# Patient Record
Sex: MALE | Race: BLACK OR AFRICAN AMERICAN | NOT HISPANIC OR LATINO | Employment: UNEMPLOYED | URBAN - METROPOLITAN AREA
[De-identification: names, ages, dates, MRNs, and addresses within clinical notes are randomized per-mention and may not be internally consistent; named-entity substitution may affect disease eponyms.]

---

## 2017-03-06 ENCOUNTER — OFFICE VISIT (OUTPATIENT)
Dept: FAMILY MEDICINE | Facility: CLINIC | Age: 52
End: 2017-03-06

## 2017-03-06 VITALS
SYSTOLIC BLOOD PRESSURE: 111 MMHG | TEMPERATURE: 98.7 F | WEIGHT: 175.2 LBS | HEART RATE: 103 BPM | DIASTOLIC BLOOD PRESSURE: 75 MMHG | BODY MASS INDEX: 26.64 KG/M2

## 2017-03-06 DIAGNOSIS — S83.281A TEAR OF LATERAL CARTILAGE OR MENISCUS OF KNEE, CURRENT, RIGHT, INITIAL ENCOUNTER: Primary | ICD-10-CM

## 2017-03-06 RX ORDER — NAPROXEN 500 MG/1
500 TABLET ORAL 2 TIMES DAILY WITH MEALS
Qty: 60 TABLET | Refills: 1 | Status: SHIPPED | OUTPATIENT
Start: 2017-03-06 | End: 2017-04-26

## 2017-03-06 NOTE — PROGRESS NOTES
"Subjective/HPI:    Missael Garcia is a 51-year-old male presenting with \"pain in knees.\"    Had an injury which he has trouble describing in high school involving his right knee. Since then, knee clicks and locks. Had one episode (~3-4 years ago - he is a poor historian for time), in which his right kneecap became displaced and he had to push it back into place. Has more trouble going up stairs than down them. Will sometimes have to \"pull down\" on his lower leg when his knee locks so the joint becomes mobile again.    Left knee also clicks and locks but isn't as painful or as bothersome.    Has not taken any medication for the pain. Has never done physical therapy.    No other questions or concerns at this time.    Objective:    /75 (BP Location: Left arm, Patient Position: Chair, Cuff Size: Adult Regular)  Pulse 103  Temp 98.7  F (37.1  C) (Oral)  Wt 175 lb 3.2 oz (79.5 kg)  BMI 26.64 kg/m2    Constitutional: Alert, attention a bit distracted, sometimes responds slowly or late to questions, poor eye contact unless directly communicating with him. No acute distress.  CV: RRR, normal S1,S2, no m/r/g's  Resp: No accessory muscle use, CTAB.  MSK: No evidence of effusion, swelling, hematoma, erythema. Non-tender to palpation. Clicking, locking, palpable crepitus in both knees with flexion/extension of joint. No pain with varus/valgus stress bilaterally.    Assessment and Plan:    1. Bilateral Meniscus Tear: worse in right knee. Talk about risks and benefits of surgery vs. Conservative management. Patient would like to trial PT before considering surgical interventions. Will also give naproxen for symptom relief.  - PT referral  - naproxen BID for pain  - if no relief in 6 months, consider reparative surgery/referral to ortho  "

## 2017-03-06 NOTE — MR AVS SNAPSHOT
After Visit Summary   3/6/2017    Missael Garcia    MRN: [id number]           Patient Information     Date Of Birth          1965        Visit Information        Provider Department      3/6/2017 1:50 PM Tariq Guzman MD [facility]        Today's Diagnoses     Tear of lateral cartilage or meniscus of knee, current, right, initial encounter    -  1      Care Instructions    3/8/2017 8:13 AM: [facility] ([phone]). They will contact patient to richard Garcia         Follow-ups after your visit        Additional Services     PHYSICAL THERAPY REFERRAL       PT/OT REFERRAL  Missael Garcia  1965  Phone #: [phone] (home)    needed: No  Language: English    PT/OT  Facility: Eval and treat for right knee pain.  Clinically this is an int derangement.                  Who to contact     Please call your clinic at [phone] to:    Ask questions about your health    Make or cancel appointments    Discuss your medicines    Learn about your test results    Speak to your doctor   If you have compliments or concerns about an experience at your clinic, or if you wish to file a complaint, please contact [facility] Patient Relations at [phone] or email us at [email or web address]         Additional Information About Your Visit        MyChart Information     [employer] gives you secure access to your electronic health record. If you see a primary care provider, you can also send messages to your care team and make appointments. If you have questions, please call your primary care clinic.  If you do not have a primary care provider, please call [phone] and they will assist you.      [employer] is an electronic gateway that provides easy, online access to your medical records. With [employer], you can request a clinic appointment, read your test results, renew a prescription or communicate  with your care team.     To access your existing account, please contact your [facility] or call [phone] for assistance.        Care EveryWhere ID     This is your Care EveryWhere ID. This could be used by other organizations to access your [place] medical records  [id number]        Your Vitals Were     Pulse Temperature BMI (Body Mass Index)             103 98.7  F (37.1  C) (Oral) 26.64 kg/m2          Blood Pressure from Last 3 Encounters:   03/06/17 111/75   10/11/16 116/80   08/30/16 123/85    Weight from Last 3 Encounters:   03/06/17 175 lb 3.2 oz (79.5 kg)   10/11/16 172 lb 6.4 oz (78.2 kg)   08/30/16 167 lb 6 oz (75.9 kg)              We Performed the Following     XR KNEE RT 3 VW          Today's Medication Changes          These changes are accurate as of: 3/6/17 11:59 PM.  If you have any questions, ask your nurse or doctor.               Start taking these medicines.        Dose/Directions    naproxen 500 MG tablet   Commonly known as:  NAPROSYN   Used for:  Tear of lateral cartilage or meniscus of knee, current, right, initial encounter   Started by:  Tariq Guzman MD        Dose:  500 mg   Take 1 tablet (500 mg) by mouth 2 times daily (with meals)   Quantity:  60 tablet   Refills:  1            Where to get your medicines      These medications were sent to [address] [address]     Phone:  [phone]     naproxen 500 MG tablet                Primary Care Provider Office Phone # Fax #    Tariq Guzman [phone]       [address] [address]        Thank you!     Thank you for choosing [facility]  for your care. Our goal is always to provide you with excellent care. Hearing back from our patients is one way we can continue to improve our services. Please take a few minutes to complete the written survey that you may  receive in the mail after your visit with us. Thank you!             Your Updated Medication List - Protect others around you: Learn how to safely use, store and throw away your medicines at www.disposemymeds.org.          This list is accurate as of: 3/6/17 11:59 PM.  Always use your most recent med list.                   Brand Name Dispense Instructions for use    benztropine 1 MG tablet    COGENTIN     Take 1 mg by mouth At Bedtime       BUPROPION HCL PO          CLONIDINE HCL PO      Take 0.1 mg by mouth as needed Take every morning and 1-2 prn for impulsivity.       haloperidol 5 MG tablet    HALDOL     Take 5 mg by mouth At Bedtime       naproxen 500 MG tablet    NAPROSYN    60 tablet    Take 1 tablet (500 mg) by mouth 2 times daily (with meals)       omeprazole 20 MG CR capsule    priLOSEC    30 capsule    Take 1 capsule (20 mg) by mouth 2 times daily       * order for DME     1 Units    Equipment being ordered: Jobst compression stockings knee high, 30 torr       * order for DME     1 Units    Equipment being ordered: wheeled knee walker for use after foot surgery       PRAZOSIN HCL PO      Take 2 mg by mouth At Bedtime       SEROQUEL PO      Take 400 mg by mouth 2 qhs       traZODone 100 MG tablet    DESYREL     Take 2 tablets by mouth At Bedtime       venlafaxine 150 MG 24 hr capsule    EFFEXOR-XR     Take 2 capsules by mouth daily       * Notice:  This list has 2 medication(s) that are the same as other medications prescribed for you. Read the directions carefully, and ask your doctor or other care provider to review them with you.

## 2017-03-08 ENCOUNTER — AMBULATORY - HEALTHEAST (OUTPATIENT)
Dept: ADMINISTRATIVE | Facility: REHABILITATION | Age: 52
End: 2017-03-08

## 2017-03-08 DIAGNOSIS — S83.281D TEAR OF LATERAL CARTILAGE OR MENISCUS OF KNEE, CURRENT, RIGHT, SUBSEQUENT ENCOUNTER: ICD-10-CM

## 2017-03-08 NOTE — PATIENT INSTRUCTIONS
3/8/2017 8:13 AM: Physical Therapy referral faxed to [facility] ([phone]). They will contact patient to schedule.  Tashia Garcia

## 2017-04-06 DIAGNOSIS — K21.9 GASTROESOPHAGEAL REFLUX DISEASE, ESOPHAGITIS PRESENCE NOT SPECIFIED: ICD-10-CM

## 2017-04-26 DIAGNOSIS — S83.281A TEAR OF LATERAL CARTILAGE OR MENISCUS OF KNEE, CURRENT, RIGHT, INITIAL ENCOUNTER: ICD-10-CM

## 2017-04-26 RX ORDER — NAPROXEN 500 MG/1
500 TABLET ORAL 2 TIMES DAILY WITH MEALS
Qty: 60 TABLET | Refills: 1 | Status: SHIPPED | OUTPATIENT
Start: 2017-04-26 | End: 2017-06-22

## 2017-04-28 ENCOUNTER — AMBULATORY - HEALTHEAST (OUTPATIENT)
Dept: ADMINISTRATIVE | Facility: REHABILITATION | Age: 52
End: 2017-04-28

## 2017-04-28 ENCOUNTER — OFFICE VISIT (OUTPATIENT)
Dept: FAMILY MEDICINE | Facility: CLINIC | Age: 52
End: 2017-04-28

## 2017-04-28 VITALS
BODY MASS INDEX: 27.06 KG/M2 | HEIGHT: 67 IN | HEART RATE: 97 BPM | DIASTOLIC BLOOD PRESSURE: 86 MMHG | WEIGHT: 172.4 LBS | SYSTOLIC BLOOD PRESSURE: 118 MMHG

## 2017-04-28 DIAGNOSIS — M17.0 OSTEOARTHRITIS OF BOTH KNEES, UNSPECIFIED OSTEOARTHRITIS TYPE: ICD-10-CM

## 2017-04-28 DIAGNOSIS — M17.0 PRIMARY OSTEOARTHRITIS OF BOTH KNEES: ICD-10-CM

## 2017-04-28 DIAGNOSIS — S83.289D TEAR OF LATERAL CARTILAGE OR MENISCUS OF KNEE, CURRENT, UNSPECIFIED LATERALITY, SUBSEQUENT ENCOUNTER: Primary | ICD-10-CM

## 2017-04-28 NOTE — PROGRESS NOTES
SUBJECTIVE:  Missael Garcia comes in today because of ongoing knee pain, which is something that he had for several months.  I ordered an x-ray of the knees and [too much background noise].  He stated the knees hurt mostly when he was ambulating.  It hurt in particular when he was going downstairs, which was more painful than going up stairs.  It hurt  equally in both knees.  At this point, he had only tried some naproxen for the knee pain, and it helped a little bit, but not greatly.   OBJECTIVE:  On examination, patient had full ROM of the knees, with a little bit of pain at the extremes of extension and flexion.  No crepitus.  No warmth or swelling.  No joint line tenderness.  Pulses were intact at the ankle bilaterally.  Good capillary refill.   X-rays were obtained which showed some slight narrowing of the joint space bilaterally, but no osteophytes or erosions.   ASSESSMENT:  Probable early OA.   PLAN:  We'll send him to Physical Therapy and see him back in a month's time.  If he isn't improving, we could proceed with corticosteroid injections if patient so desires.

## 2017-04-28 NOTE — PATIENT INSTRUCTIONS
PHYSICAL THERAPY REFERRAL:  Orders and demographics faxed to:  [facility] and they will contact  Adilene Pfeiffer () [phone] to schedule for patient.  PH:  [phone]  FAX:  [phone]  Elizabeth Ahmadi  4/28/17

## 2017-04-28 NOTE — MR AVS SNAPSHOT
After Visit Summary   4/28/2017    Missael Garcia    MRN: [id number]           Patient Information     Date Of Birth          1965        Visit Information        Provider Department      4/28/2017 9:20 AM Tariq Guzman MD [facility]        Today's Diagnoses     Tear of lateral cartilage or meniscus of knee, current, unspecified laterality, subsequent encounter    -  1    Osteoarthritis of both knees, unspecified osteoarthritis type          Care Instructions    PHYSICAL THERAPY REFERRAL:  Orders and demographics faxed to:  [facility] and they will contact  Adilene Pfeiffer () [phone] to schedule for patient.  PH:  [phone]  FAX:  [phone]  Elizabeth Ahmadi  4/28/17        Follow-ups after your visit        Additional Services     PHYSICAL THERAPY REFERRAL       PT/OT REFERRAL  Missael Garcia  1965  Phone #: [phone] (home)    needed: No  Language: English    PT/OT  Facility: [facility]      History: Bilat knee pain with known osteoarthritis.  Improve flexibility and motion.    Precautions/Contraindications: None    Imaging Studies: X-Ray    Surgical Procedure/Test Results: None    Treatment Goals:   Increase: Flexibility and Strength    Prognosis: good    Visits: Up to 12    Evaluate: Evaluate and treat    Plan: Manual Therapy and Flexibility Exercise    Ionto/Phonophoresis ok                  Who to contact     Please call your clinic at [phone] to:    Ask questions about your health    Make or cancel appointments    Discuss your medicines    Learn about your test results    Speak to your doctor   If you have compliments or concerns about an experience at your clinic, or if you wish to file a complaint, please contact [facility] Patient Relations at [phone] or email us at [email or web address]         Additional Information About Your Visit        MyChart Information     [employer] gives you secure  "access to your electronic health record. If you see a primary care provider, you can also send messages to your care team and make appointments. If you have questions, please call your primary care clinic.  If you do not have a primary care provider, please call [phone] and they will assist you.      [employer] is an electronic gateway that provides easy, online access to your medical records. With [employer], you can request a clinic appointment, read your test results, renew a prescription or communicate with your care team.     To access your existing account, please contact your [facility] or call [phone] for assistance.        Care EveryWhere ID     This is your Care EveryWhere ID. This could be used by other organizations to access your [place] medical records  [id number]        Your Vitals Were     Pulse Height BMI (Body Mass Index)             97 5' 6.75\" (169.5 cm) 27.2 kg/m2          Blood Pressure from Last 3 Encounters:   04/28/17 118/86   03/06/17 111/75   10/11/16 116/80    Weight from Last 3 Encounters:   04/28/17 172 lb 6.4 oz (78.2 kg)   03/06/17 175 lb 3.2 oz (79.5 kg)   10/11/16 172 lb 6.4 oz (78.2 kg)              We Performed the Following     XR KNEE RT 3 VW        Primary Care Provider Office Phone # Fax #    Tariq Guzman [phone]       [place] [address]        Thank you!     Thank you for choosing [facility]  for your care. Our goal is always to provide you with excellent care. Hearing back from our patients is one way we can continue to improve our services. Please take a few minutes to complete the written survey that you may receive in the mail after your visit with us. Thank you!             Your Updated Medication List - Protect others around you: Learn how to safely use, store and throw away your medicines at www.disposemymeds.org.          This list is accurate as of: 4/28/17 11:59 PM.  " Always use your most recent med list.                   Brand Name Dispense Instructions for use    benztropine 1 MG tablet    COGENTIN     Take 1 mg by mouth At Bedtime       BUPROPION HCL PO          CLONIDINE HCL PO      Take 0.1 mg by mouth as needed Take every morning and 1-2 prn for impulsivity.       haloperidol 5 MG tablet    HALDOL     Take 5 mg by mouth At Bedtime       naproxen 500 MG tablet    NAPROSYN    60 tablet    Take 1 tablet (500 mg) by mouth 2 times daily (with meals)       omeprazole 20 MG CR capsule    priLOSEC    30 capsule    Take 1 capsule (20 mg) by mouth 2 times daily       * order for DME     1 Units    Equipment being ordered: Jobst compression stockings knee high, 30 torr       * order for DME     1 Units    Equipment being ordered: wheeled knee walker for use after foot surgery       PRAZOSIN HCL PO      Take 2 mg by mouth At Bedtime       SEROQUEL PO      Take 400 mg by mouth 2 qhs       traZODone 100 MG tablet    DESYREL     Take 2 tablets by mouth At Bedtime       venlafaxine 150 MG 24 hr capsule    EFFEXOR-XR     Take 2 capsules by mouth daily       * Notice:  This list has 2 medication(s) that are the same as other medications prescribed for you. Read the directions carefully, and ask your doctor or other care provider to review them with you.

## 2017-05-02 ENCOUNTER — OFFICE VISIT - HEALTHEAST (OUTPATIENT)
Dept: PHYSICAL THERAPY | Facility: REHABILITATION | Age: 52
End: 2017-05-02

## 2017-05-02 DIAGNOSIS — M25.561 BILATERAL CHRONIC KNEE PAIN: ICD-10-CM

## 2017-05-02 DIAGNOSIS — M17.0 PRIMARY OSTEOARTHRITIS OF BOTH KNEES: ICD-10-CM

## 2017-05-02 DIAGNOSIS — M62.81 GENERALIZED MUSCLE WEAKNESS: ICD-10-CM

## 2017-05-02 DIAGNOSIS — M25.562 BILATERAL CHRONIC KNEE PAIN: ICD-10-CM

## 2017-05-02 DIAGNOSIS — G89.29 BILATERAL CHRONIC KNEE PAIN: ICD-10-CM

## 2017-05-04 ENCOUNTER — COMMUNICATION - HEALTHEAST (OUTPATIENT)
Dept: PHYSICAL THERAPY | Facility: REHABILITATION | Age: 52
End: 2017-05-04

## 2017-05-23 ENCOUNTER — OFFICE VISIT (OUTPATIENT)
Dept: FAMILY MEDICINE | Facility: CLINIC | Age: 52
End: 2017-05-23

## 2017-05-23 VITALS
WEIGHT: 172.8 LBS | BODY MASS INDEX: 27.27 KG/M2 | DIASTOLIC BLOOD PRESSURE: 77 MMHG | SYSTOLIC BLOOD PRESSURE: 115 MMHG | TEMPERATURE: 98.5 F | HEART RATE: 58 BPM

## 2017-05-23 DIAGNOSIS — H93.292 AUDITORY COMPLAINTS OF LEFT EAR: ICD-10-CM

## 2017-05-23 DIAGNOSIS — N52.9 ERECTILE DYSFUNCTION, UNSPECIFIED ERECTILE DYSFUNCTION TYPE: Primary | ICD-10-CM

## 2017-05-23 NOTE — MR AVS SNAPSHOT
After Visit Summary   5/23/2017    Missael Garcia    MRN: [id number]           Patient Information     Date Of Birth          1965        Visit Information        Provider Department      5/23/2017 10:40 AM Tariq Guzman MD [facility]        Today's Diagnoses     Erectile dysfunction, unspecified erectile dysfunction type    -  1    Auditory complaints of left ear           Follow-ups after your visit        Who to contact     Please call your clinic at [phone] to:    Ask questions about your health    Make or cancel appointments    Discuss your medicines    Learn about your test results    Speak to your doctor   If you have compliments or concerns about an experience at your clinic, or if you wish to file a complaint, please contact [facility] Patient Relations at [phone] or email us at [email or web address]         Additional Information About Your Visit        MyChart Information     [employer] gives you secure access to your electronic health record. If you see a primary care provider, you can also send messages to your care team and make appointments. If you have questions, please call your primary care clinic.  If you do not have a primary care provider, please call [phone] and they will assist you.      [employer] is an electronic gateway that provides easy, online access to your medical records. With [employer], you can request a clinic appointment, read your test results, renew a prescription or communicate with your care team.     To access your existing account, please contact your [facility] or call [phone] for assistance.        Care EveryWhere ID     This is your Care EveryWhere ID. This could be used by other organizations to access your [place] medical records  [id number]        Your Vitals Were     Pulse Temperature BMI (Body Mass Index)             58 98.5  F (36.9  C) (Oral) 27.27 kg/m2           Blood Pressure from Last 3 Encounters:   05/23/17 115/77   04/28/17 118/86   03/06/17 111/75    Weight from Last 3 Encounters:   05/23/17 172 lb 12.8 oz (78.4 kg)   04/28/17 172 lb 6.4 oz (78.2 kg)   03/06/17 175 lb 3.2 oz (79.5 kg)              Today, you had the following     No orders found for display       Primary Care Provider Office Phone # Fax #    Tariq Guzman [phone]       [address] [address]        Thank you!     Thank you for choosing [facility]  for your care. Our goal is always to provide you with excellent care. Hearing back from our patients is one way we can continue to improve our services. Please take a few minutes to complete the written survey that you may receive in the mail after your visit with us. Thank you!             Your Updated Medication List - Protect others around you: Learn how to safely use, store and throw away your medicines at www.disposemymeds.org.          This list is accurate as of: 5/23/17 11:59 PM.  Always use your most recent med list.                   Brand Name Dispense Instructions for use    benztropine 1 MG tablet    COGENTIN     Take 1 mg by mouth At Bedtime       BUPROPION HCL PO          CLONIDINE HCL PO      Take 0.1 mg by mouth as needed Take every morning and 1-2 prn for impulsivity.       haloperidol 5 MG tablet    HALDOL     Take 5 mg by mouth At Bedtime       naproxen 500 MG tablet    NAPROSYN    60 tablet    Take 1 tablet (500 mg) by mouth 2 times daily (with meals)       omeprazole 20 MG CR capsule    priLOSEC    30 capsule    Take 1 capsule (20 mg) by mouth 2 times daily       * order for DME     1 Units    Equipment being ordered: Jobst compression stockings knee high, 30 torr       * order for DME     1 Units    Equipment being ordered: wheeled knee walker for use after foot surgery       PRAZOSIN HCL PO      Take 2 mg by mouth At Bedtime       SEROQUEL PO      Take 400 mg by mouth 2  qhs       sildenafil 50 MG cap/tab    REVATIO/VIAGRA    12 tablet    Take 0.5-1 tablets (25-50 mg) by mouth daily as needed for erectile dysfunction Take 30 min to 4 hours before intercourse.  Never use with nitroglycerin, terazosin or doxazosin.       traZODone 100 MG tablet    DESYREL     Take 2 tablets by mouth At Bedtime       venlafaxine 150 MG 24 hr capsule    EFFEXOR-XR     Take 2 capsules by mouth daily       * Notice:  This list has 2 medication(s) that are the same as other medications prescribed for you. Read the directions carefully, and ask your doctor or other care provider to review them with you.

## 2017-05-26 NOTE — PROGRESS NOTES
SUBJECTIVE:  Missael Garcia is in today with two concerns:   1.  He states he has had diminished hearing in the left ear that has been going on for a few days.  He has been trying to clean it out with Q-tips and this hasn't been successful.  He doesn't recall any injury to the ear.  He has had no recent infections.  He isn't on any new medications.   2.  He reports having erectile dysfunction.  He states he is unable to achieve or sustain an erection.  In the past, he has done this and had successful ejaculations.  He doesn't have any nocturnal emissions.  He hasn't had any penile difficulties such as penile discharge or pain.     PMHX/PSHX/MEDS/ALLERGIES/SHX/FHX reviewed and updated in Epic.   ROS:   General: No fevers, chills   Head: No headache   Ears: See HPI  CV: No chest pain  Resp: No shortness of breath. No cough.  Objective: /77 (BP Location: Left arm, Patient Position: Chair, Cuff Size: Adult Regular)  Pulse 58  Temp 98.5  F (36.9  C) (Oral)  Wt 172 lb 12.8 oz (78.4 kg)  BMI 27.27 kg/m2   Gen: Well nourished and in NAD   HEENT: TMs normal color and landmarks, nasopharynx pink and moist, oropharynx pink and moist.  Normal hearing test completed by the MA  CV: RRR - no murmurs, rubs, or gallups,   Pulm: CTAB, no wheezes/rales/rhonchi, good air entry   ABD: soft, nontender, BS intact  Extrem: no cyanosis, edema or clubbing   Psych: Euthymic      Assessment/ Plan:  1. Erectile dysfunction, unspecified erectile dysfunction type  Will tx with Viagra.  Pt needs a lab evaluation as well but he will complete this in two weeks when he follow up; he does not have time to go to lab today.    2. Auditory complaints of left ear  Normal exam and completely normal hearing test in the office so we will just keep an eye on this.    Total of 25 minutes was spent in face to face contact with patient with > 50% in counseling and coordination of care.  Options for treatment and/or follow-up care were reviewed with the  patient. Missael Garcia was engaged and actively involved in the decision making process. He verbalized understanding of the options discussed and was satisfied with the final plan.    RTC in 2 wks for lab w/u of ED or sooner if develops new or worsening symptoms.    Tariq Guzman

## 2017-05-30 PROBLEM — N52.9 ERECTILE DYSFUNCTION, UNSPECIFIED ERECTILE DYSFUNCTION TYPE: Status: ACTIVE | Noted: 2017-05-30

## 2017-05-30 RX ORDER — SILDENAFIL 50 MG/1
25-50 TABLET, FILM COATED ORAL DAILY PRN
Qty: 12 TABLET | Refills: 11 | COMMUNITY
Start: 2017-05-30 | End: 2020-09-23

## 2017-06-22 DIAGNOSIS — S83.281A TEAR OF LATERAL CARTILAGE OR MENISCUS OF KNEE, CURRENT, RIGHT, INITIAL ENCOUNTER: ICD-10-CM

## 2017-06-22 RX ORDER — NAPROXEN 500 MG/1
500 TABLET ORAL 2 TIMES DAILY WITH MEALS
Qty: 60 TABLET | Refills: 1 | Status: SHIPPED | OUTPATIENT
Start: 2017-06-22 | End: 2017-08-16

## 2017-07-20 DIAGNOSIS — K21.9 GASTROESOPHAGEAL REFLUX DISEASE, ESOPHAGITIS PRESENCE NOT SPECIFIED: ICD-10-CM

## 2017-08-16 DIAGNOSIS — S83.281A TEAR OF LATERAL CARTILAGE OR MENISCUS OF KNEE, CURRENT, RIGHT, INITIAL ENCOUNTER: ICD-10-CM

## 2017-08-16 RX ORDER — NAPROXEN 500 MG/1
500 TABLET ORAL 2 TIMES DAILY WITH MEALS
Qty: 60 TABLET | Refills: 1 | Status: SHIPPED | OUTPATIENT
Start: 2017-08-16 | End: 2017-10-12

## 2017-09-21 DIAGNOSIS — K21.9 GASTROESOPHAGEAL REFLUX DISEASE, ESOPHAGITIS PRESENCE NOT SPECIFIED: ICD-10-CM

## 2017-09-22 ENCOUNTER — TELEPHONE (OUTPATIENT)
Dept: FAMILY MEDICINE | Facility: CLINIC | Age: 52
End: 2017-09-22

## 2017-09-22 DIAGNOSIS — R09.A2 GLOBUS HYSTERICUS: Primary | ICD-10-CM

## 2017-09-22 NOTE — TELEPHONE ENCOUNTER
[facility] phone call message- medication clarification/question:    Full Medication Name:    omeprazole (PRILOSEC) 20 MG CR capsule    Question:   Please change the quantity to 60 due to patient takes capsule 2 times per day. Please re-send to [facility].     Pharmacy confirmed as    [facility] - [place], [place] - [address] [facility] - [place], [place] - [address]: Yes    OK to leave a message on voice mail? Yes    Primary language: English      needed? No    Call taken on September 22, 2017 at 9:05 AM by Elizabeth Ahmadi

## 2017-10-12 DIAGNOSIS — S83.281A TEAR OF LATERAL CARTILAGE OR MENISCUS OF KNEE, CURRENT, RIGHT, INITIAL ENCOUNTER: ICD-10-CM

## 2017-10-13 RX ORDER — NAPROXEN 500 MG/1
500 TABLET ORAL 2 TIMES DAILY WITH MEALS
Qty: 60 TABLET | Refills: 1 | Status: SHIPPED | OUTPATIENT
Start: 2017-10-13 | End: 2017-12-18

## 2017-12-18 DIAGNOSIS — S83.281A TEAR OF LATERAL CARTILAGE OR MENISCUS OF KNEE, CURRENT, RIGHT, INITIAL ENCOUNTER: ICD-10-CM

## 2017-12-18 RX ORDER — NAPROXEN 500 MG/1
500 TABLET ORAL 2 TIMES DAILY WITH MEALS
Qty: 60 TABLET | Refills: 1 | Status: SHIPPED | OUTPATIENT
Start: 2017-12-18 | End: 2018-02-12

## 2018-02-12 DIAGNOSIS — S83.281A TEAR OF LATERAL CARTILAGE OR MENISCUS OF KNEE, CURRENT, RIGHT, INITIAL ENCOUNTER: ICD-10-CM

## 2018-02-12 RX ORDER — NAPROXEN 500 MG/1
500 TABLET ORAL 2 TIMES DAILY WITH MEALS
Qty: 60 TABLET | Refills: 11 | Status: SHIPPED | OUTPATIENT
Start: 2018-02-12 | End: 2019-02-04

## 2018-05-11 ENCOUNTER — OFFICE VISIT (OUTPATIENT)
Dept: FAMILY MEDICINE | Facility: CLINIC | Age: 53
End: 2018-05-11
Payer: MEDICARE

## 2018-05-11 VITALS
OXYGEN SATURATION: 95 % | WEIGHT: 158 LBS | BODY MASS INDEX: 24.8 KG/M2 | RESPIRATION RATE: 24 BRPM | HEIGHT: 67 IN | DIASTOLIC BLOOD PRESSURE: 83 MMHG | SYSTOLIC BLOOD PRESSURE: 124 MMHG | TEMPERATURE: 98.5 F | HEART RATE: 82 BPM

## 2018-05-11 DIAGNOSIS — Z13.1 SCREENING FOR DIABETES MELLITUS: ICD-10-CM

## 2018-05-11 DIAGNOSIS — I95.1 ORTHOSTATIC HYPOTENSION: ICD-10-CM

## 2018-05-11 DIAGNOSIS — F20.0 CHRONIC PARANOID SCHIZOPHRENIA (H): Primary | ICD-10-CM

## 2018-05-11 DIAGNOSIS — M21.611 BUNION, RIGHT: ICD-10-CM

## 2018-05-11 DIAGNOSIS — M21.612 BUNION, LEFT: ICD-10-CM

## 2018-05-11 LAB
BUN SERPL-MCNC: 11.6 MG/DL (ref 7–21)
CALCIUM SERPL-MCNC: 9.1 MG/DL (ref 8.5–10.1)
CHLORIDE SERPLBLD-SCNC: 101.8 MMOL/L (ref 98–110)
CHOLEST SERPL-MCNC: 155.2 MG/DL (ref 0–200)
CHOLEST/HDLC SERPL: 3.2 {RATIO} (ref 0–5)
CO2 SERPL-SCNC: 25.1 MMOL/L (ref 20–32)
CREAT SERPL-MCNC: 1.1 MG/DL (ref 0.7–1.3)
GFR SERPL CREATININE-BSD FRML MDRD: 74.7 ML/MIN/1.7 M2
GLUCOSE SERPL-MCNC: 121.4 MG'DL (ref 70–99)
HDLC SERPL-MCNC: 48.8 MG/DL
LDLC SERPL CALC-MCNC: 60 MG/DL (ref 0–129)
POTASSIUM SERPL-SCNC: 4.4 MMOL/DL (ref 3.2–4.6)
SODIUM SERPL-SCNC: 135.9 MMOL/L (ref 132–142)
TRIGL SERPL-MCNC: 230.2 MG/DL (ref 0–150)
VLDL CHOLESTEROL: 46 MG/DL (ref 7–32)

## 2018-05-11 NOTE — MR AVS SNAPSHOT
"              After Visit Summary   5/11/2018    Missael Garcia    MRN: [id number]           Patient Information     Date Of Birth          1965        Visit Information        Provider Department      5/11/2018 10:00 AM Tariq Guzman MD [facility]        Today's Diagnoses     Chronic paranoid schizophrenia (H)    -  1    Bunion, left        Bunion, right        Orthostatic hypotension        Screening for diabetes mellitus           Follow-ups after your visit        Additional Services     PODIATRY/FOOT & ANKLE SURGERY REFERRAL       Refer to Dr. Anthony or Dr. Ro for surgery for surgery on the great toes.                  Who to contact     Please call your clinic at [phone] to:    Ask questions about your health    Make or cancel appointments    Discuss your medicines    Learn about your test results    Speak to your doctor            Additional Information About Your Visit        MyChart Information     [employer] gives you secure access to your electronic health record. If you see a primary care provider, you can also send messages to your care team and make appointments. If you have questions, please call your primary care clinic.  If you do not have a primary care provider, please call [phone] and they will assist you.      [employer] is an electronic gateway that provides easy, online access to your medical records. With [employer], you can request a clinic appointment, read your test results, renew a prescription or communicate with your care team.     To access your existing account, please contact your [facility] or call [phone] for assistance.        Care EveryWhere ID     This is your Care EveryWhere ID. This could be used by other organizations to access your [place] medical records  [id number]        Your Vitals Were     Pulse Temperature Respirations Height Pulse Oximetry BMI (Body Mass Index)    82 98.5  F (36.9  C) (Oral) 24 5' 6.81\" (1.697 m) " 95% 24.89 kg/m2       Blood Pressure from Last 3 Encounters:   05/11/18 124/83   05/23/17 115/77   04/28/17 118/86    Weight from Last 3 Encounters:   05/11/18 158 lb (71.7 kg)   05/23/17 172 lb 12.8 oz (78.4 kg)   04/28/17 172 lb 6.4 oz (78.2 kg)              We Performed the Following     Basic Metabolic Panel ([facility])     CBC with platelets     Lipid Panel ([facility])     PODIATRY/FOOT & ANKLE SURGERY REFERRAL          Today's Medication Changes          These changes are accurate as of 5/11/18 11:59 PM.  If you have any questions, ask your nurse or doctor.               These medicines have changed or have updated prescriptions.        Dose/Directions    omeprazole 20 MG CR capsule   Commonly known as:  priLOSEC   This may have changed:  Another medication with the same name was removed. Continue taking this medication, and follow the directions you see here.   Used for:  Gastroesophageal reflux disease, esophagitis presence not specified   Changed by:  Tariq Guzman MD        Dose:  20 mg   Take 1 capsule (20 mg) by mouth 2 times daily   Quantity:  30 capsule   Refills:  3         Stop taking these medicines if you haven't already. Please contact your care team if you have questions.     order for DME   Stopped by:  Tariq Guzman MD                    Primary Care Provider Office Phone # Fax #    Tarqi Guzman [phone]       [address] [place] [address]        Equal Access to Services     Parnassus campusKWASI AH: Hadii rukhsana law hadasho Sojoseali, waaxda luqadaha, qaybta kaalmada adeegyada, taqueria turner . So [facility] [phone].    ATENCIÓN: Si habla español, tiene a polo disposición servicios gratuitos de asistencia lingüística. Llame al [phone].    We comply with applicable federal civil rights laws and [place] laws. We do not discriminate on the basis of race, color, national origin, age, disability, sex, sexual orientation, or gender identity.            Thank  you!     Thank you for choosing [facility]  for your care. Our goal is always to provide you with excellent care. Hearing back from our patients is one way we can continue to improve our services. Please take a few minutes to complete the written survey that you may receive in the mail after your visit with us. Thank you!             Your Updated Medication List - Protect others around you: Learn how to safely use, store and throw away your medicines at www.disposemymeds.org.          This list is accurate as of 5/11/18 11:59 PM.  Always use your most recent med list.                   Brand Name Dispense Instructions for use Diagnosis    benztropine 1 MG tablet    COGENTIN     Take 1 mg by mouth At Bedtime        BUPROPION HCL PO           CLONIDINE HCL PO      Take 0.1 mg by mouth as needed Take every morning and 1-2 prn for impulsivity.        haloperidol 5 MG tablet    HALDOL     Take 5 mg by mouth At Bedtime        naproxen 500 MG tablet    NAPROSYN    60 tablet    Take 1 tablet (500 mg) by mouth 2 times daily (with meals)    Tear of lateral cartilage or meniscus of knee, current, right, initial encounter       omeprazole 20 MG CR capsule    priLOSEC    30 capsule    Take 1 capsule (20 mg) by mouth 2 times daily    Gastroesophageal reflux disease, esophagitis presence not specified       PRAZOSIN HCL PO      Take 2 mg by mouth At Bedtime        SEROQUEL PO      Take 400 mg by mouth 2 qhs        sildenafil 50 MG tablet    VIAGRA    12 tablet    Take 0.5-1 tablets (25-50 mg) by mouth daily as needed for erectile dysfunction Take 30 min to 4 hours before intercourse.  Never use with nitroglycerin, terazosin or doxazosin.        traZODone 100 MG tablet    DESYREL     Take 2 tablets by mouth At Bedtime        venlafaxine 150 MG 24 hr capsule    EFFEXOR-XR     Take 2 capsules by mouth daily

## 2018-05-14 NOTE — PROGRESS NOTES
"Subjective: Missael Garcia is a 52 year old who presents today for a few different concerns.  1.  He is on psychotropic medications for schizophrenia.  These are prescribed by a psychiatrist but he has not had any lab testing in a couple years for this.  We should do that today.  2.  He is having problems with both feet that he like to have evaluated.  It hurts when he walks and when he bears any weight on them.  He previously had surgery for bunions but it sounds like he did not follow the postoperative care directions completely and did not heal particularly well.  3.  There is a family history of diabetes and he needs screening being for diabetes.  4.  Patient occasionally has had orthostatic hypotension and he reports at this point he is doing quite well.  He is not wearing his compression stockings but he does okay as long as he gets up slowly from a chair.  He has not had any syncope.  PMHX/PSHX/MEDS/ALLERGIES/SHX/FHX reviewed and updated in Epic.   ROS:   General: No fevers, chills   Head: No headache   Ears: No acute change in hearing.   CV: No chest pain or palpitations.   Resp: No shortness of breath. No cough. No hemoptysis.   Objective: /83  Pulse 82  Temp 98.5  F (36.9  C) (Oral)  Resp 24  Ht 5' 6.81\" (1.697 m)  Wt 158 lb (71.7 kg)  SpO2 95%  BMI 24.89 kg/m2   Gen: Well nourished and in NAD   HEENT: TMs normal color and landmarks, nasopharynx pink and moist, oropharynx pink and moist  CV: RRR - no murmurs, rubs, or gallups,   Pulm: CTAB, no wheezes/rales/rhonchi, good air entry   ABD: soft, nontender, BS intact  Extrem: no cyanosis, edema or clubbing; he has bilateral significant lateral deviation at the MTP joints.  This is causing overlap of his toes and some degree of skin breakdown on the second toe of the right foot.  Psych: Euthymic    Assessment/ Plan:  1. Bunion, left  - PODIATRY/FOOT & ANKLE SURGERY REFERRAL    2. Bunion, right  - PODIATRY/FOOT & ANKLE SURGERY REFERRAL    3. Chronic " paranoid schizophrenia (H)  We will check laboratory work today.  - Lipid Panel ([facility])  - CBC with platelets  - Basic Metabolic Panel ([facility])    4. Orthostatic hypotension  This is currently improved and he is not needing compression stockings at this time.  He does have them at home if needed in the future.    5. Screening for diabetes mellitus      Total of 32 minutes was spent in face to face contact with patient with > 50% in counseling and coordination of care.  Options for treatment and/or follow-up care were reviewed with the patient. Missael Garcia was engaged and actively involved in the decision making process. He verbalized understanding of the options discussed and was satisfied with the final plan.      Tariq Guzman

## 2018-05-16 ENCOUNTER — AMBULATORY - HEALTHEAST (OUTPATIENT)
Dept: ADMINISTRATIVE | Facility: CLINIC | Age: 53
End: 2018-05-16

## 2018-05-16 DIAGNOSIS — M21.611 BILATERAL BUNIONS: ICD-10-CM

## 2018-05-16 DIAGNOSIS — M21.612 BILATERAL BUNIONS: ICD-10-CM

## 2018-08-28 ENCOUNTER — OFFICE VISIT (OUTPATIENT)
Dept: FAMILY MEDICINE | Facility: CLINIC | Age: 53
End: 2018-08-28
Payer: MEDICARE

## 2018-08-28 VITALS
TEMPERATURE: 98.6 F | OXYGEN SATURATION: 97 % | WEIGHT: 164.4 LBS | DIASTOLIC BLOOD PRESSURE: 75 MMHG | HEIGHT: 67 IN | HEART RATE: 103 BPM | SYSTOLIC BLOOD PRESSURE: 113 MMHG | RESPIRATION RATE: 20 BRPM | BODY MASS INDEX: 25.8 KG/M2

## 2018-08-28 DIAGNOSIS — K21.9 GASTROESOPHAGEAL REFLUX DISEASE, ESOPHAGITIS PRESENCE NOT SPECIFIED: ICD-10-CM

## 2018-08-28 DIAGNOSIS — F19.90 EXCESSIVE USE OF NONSTEROIDAL ANTI-INFLAMMATORY DRUG (NSAID): ICD-10-CM

## 2018-08-28 DIAGNOSIS — R73.9 HYPERGLYCEMIA: Primary | ICD-10-CM

## 2018-08-28 LAB
BUN SERPL-MCNC: 14.2 MG/DL (ref 7–21)
CALCIUM SERPL-MCNC: 9.6 MG/DL (ref 8.5–10.1)
CHLORIDE SERPLBLD-SCNC: 100.3 MMOL/L (ref 98–110)
CO2 SERPL-SCNC: 30.8 MMOL/L (ref 20–32)
CREAT SERPL-MCNC: 1.3 MG/DL (ref 0.7–1.3)
GFR SERPL CREATININE-BSD FRML MDRD: 61.6 ML/MIN/1.7 M2
GLUCOSE SERPL-MCNC: 80.8 MG'DL (ref 70–99)
HBA1C MFR BLD: 5.6 % (ref 4.1–5.7)
POTASSIUM SERPL-SCNC: 4.3 MMOL/DL (ref 3.2–4.6)
SODIUM SERPL-SCNC: 135.8 MMOL/L (ref 132–142)

## 2018-08-28 ASSESSMENT — PAIN SCALES - GENERAL: PAINLEVEL: NO PAIN (0)

## 2018-08-28 NOTE — PROGRESS NOTES
"Missael Garcia comes in today for the following concerns:    1) He fell down the stairs on August 4.  After that he was seen in [facility].  As he did not have headache, neck pain, or loss of consciousness, he did not have imaging of the head or neck.  He did have x-rays of his thoracic spine and chest.  These were negative for fractures.  Since that time he has been taking ibuprofen as needed.  However he is also taking his naproxen twice a day which is a scheduled medication for him.  It sounds like he has been getting too many NSAIDs for the last 3 weeks.  At this point he also has some residual pain in the right chest in the axillary line.  This is a sharp pain that he feels when he coughs or when he raises his arm at all.  It is fairly pinpoint and is felt around the fifth rib in the midaxillary line on the right.    2)  He is also needing a refill of his omeprazole.  He has a history of globus hystericus is a likely cause of sensation of not swallowing food and having a choking sensation at times when he is not eating.  He has had previous imaging of the esophagus which was normal.  He is done well on omeprazole 20 mg twice a day.      Allergies, medications and problem list reviewed and updated as needed in Epic.      REVIEW OF SYSTEMS    General: No fevers  Head: No headache  Neck: No swallowing problems   CV: No chest pain or palpitations  Resp: No shortness of breath.  No cough. No hemoptysis.  GI: No constipation, or diarrhea.  No nausea or vomiting  : No urinary c/o    /75  Pulse 103  Temp 98.6  F (37  C) (Oral)  Resp 20  Ht 5' 7\" (170.2 cm)  Wt 164 lb 6.4 oz (74.6 kg)  SpO2 97%  BMI 25.75 kg/m2      Gen:  Well nourished and in NAD  Neck: supple without lymphadenopathy  CV:  RRR  - no murmurs, rubs, or gallups  Chest: Patient does have point tenderness at what seems to be the fifth rib on the right in the mid axillary line.  He does have worsening of his pain with compression of the " rib cage.  Pulm:  CTAB, no wheezes/rales/rhonchi, good air entry   ABD: soft, nontender, no masses, no rebound, BS intact throughout  Extrem: no cyanosis, edema or clubbing  Skin: No rash  Psych: Euthymic     ASSESSMENT and PLAN:  1. Hyperglycemia  Given a history of a elevated fasting blood sugar and hemoglobin A1c which was on the upper limits of normal we will check a hemoglobin A1c today.  - Hemoglobin A1c (Lab[facility]Q)    2. Excessive use of nonsteroidal anti-inflammatory drug (NSAID)  As this patient has been taking both as needed ibuprofen and scheduled naproxen for the last 3 and half weeks we will check a BMP today.  He has been instructed to stop taking ibuprofen and just take medications which are scheduled in his pillbox.  - Basic Metabolic Panel ([facility])  - Results < 1 hr    3. Gastroesophageal reflux disease, esophagitis presence not specified  We will refill the omeprazole for a year.  - omeprazole (PRILOSEC) 20 MG CR capsule; Take 1 capsule (20 mg) by mouth 2 times daily  Dispense: 180 capsule; Refill: 3      Total of 30 minutes was spent in face to face contact with patient with > 50% in counseling and coordination of care.  Options for treatment and/or follow-up care were reviewed with the patient/family who was engaged and actively involved in the decision making process and who verbalized understanding of the options discussed and was satisfied with the final plan.    RTC in 6 months for follow up or sooner if develops new or worsening symptoms.    Tariq Guzman

## 2018-08-28 NOTE — NURSING NOTE
Chief Complaint   Patient presents with     RECHECK     [facility]- MEDS REFILL/ HAD A FALL      Gray Gage, CMA

## 2018-08-28 NOTE — MR AVS SNAPSHOT
"              After Visit Summary   8/28/2018    Missael Garcia    MRN: [id number]           Patient Information     Date Of Birth          1965        Visit Information        Provider Department      8/28/2018 2:30 PM Tariq Guzman MD [facility]        Today's Diagnoses     Hyperglycemia    -  1    Excessive use of nonsteroidal anti-inflammatory drug (NSAID)        Gastroesophageal reflux disease, esophagitis presence not specified           Follow-ups after your visit        Who to contact     Please call your clinic at [phone] to:    Ask questions about your health    Make or cancel appointments    Discuss your medicines    Learn about your test results    Speak to your doctor            Additional Information About Your Visit        MyChart Information     [employer] gives you secure access to your electronic health record. If you see a primary care provider, you can also send messages to your care team and make appointments. If you have questions, please call your primary care clinic.  If you do not have a primary care provider, please call [phone] and they will assist you.      [employer] is an electronic gateway that provides easy, online access to your medical records. With [employer], you can request a clinic appointment, read your test results, renew a prescription or communicate with your care team.     To access your existing account, please contact your [facility] or call [phone] for assistance.        Care EveryWhere ID     This is your Care EveryWhere ID. This could be used by other organizations to access your [place] medical records  [id number]        Your Vitals Were     Pulse Temperature Respirations Height Pulse Oximetry BMI (Body Mass Index)    103 98.6  F (37  C) (Oral) 20 5' 7\" (170.2 cm) 97% 25.75 kg/m2       Blood Pressure from Last 3 Encounters:   08/28/18 113/75   05/11/18 124/83   05/23/17 115/77    Weight from Last 3 Encounters: "   08/28/18 164 lb 6.4 oz (74.6 kg)   05/11/18 158 lb (71.7 kg)   05/23/17 172 lb 12.8 oz (78.4 kg)              We Performed the Following     Basic Metabolic Panel ([facility])  - Results < 1 hr     Hemoglobin A1c ([facility])          Where to get your medicines      These medications were sent to [facility] [place] [facility] - [place], [place] - [address], [place] [address]     Phone:  [phone]     omeprazole 20 MG CR capsule          Primary Care Provider Office Phone # Fax #    Tariq Guzman [phone]       [address] [place] [address]        Equal Access to Services     MAURIZIO COLLINS : Adriane montes de ocao Soomaali, waaxda luqadaha, qaybta kaalmada adeegyada, taqueria jernigan. So [facility] [phone].    ATENCIÓN: Si habla español, tiene a polo disposición servicios gratuitos de asistencia lingüística. LlAkron [facility] [phone].    We comply with applicable federal civil rights laws and [place] laws. We do not discriminate on the basis of race, color, national origin, age, disability, sex, sexual orientation, or gender identity.            Thank you!     Thank you for choosing [facility]  for your care. Our goal is always to provide you with excellent care. Hearing back from our patients is one way we can continue to improve our services. Please take a few minutes to complete the written survey that you may receive in the mail after your visit with us. Thank you!             Your Updated Medication List - Protect others around you: Learn how to safely use, store and throw away your medicines at www.disposemymeds.org.          This list is accurate as of 8/28/18  2:51 PM.  Always use your most recent med list.                   Brand Name Dispense Instructions for use Diagnosis    benztropine 1 MG tablet    COGENTIN     Take 1 mg by mouth At Bedtime        BUPROPION HCL PO           CLONIDINE HCL PO      Take 0.1 mg by mouth as needed  Take every morning and 1-2 prn for impulsivity.        haloperidol 5 MG tablet    HALDOL     Take 5 mg by mouth At Bedtime        naproxen 500 MG tablet    NAPROSYN    60 tablet    Take 1 tablet (500 mg) by mouth 2 times daily (with meals)    Tear of lateral cartilage or meniscus of knee, current, right, initial encounter       omeprazole 20 MG CR capsule    priLOSEC    180 capsule    Take 1 capsule (20 mg) by mouth 2 times daily    Gastroesophageal reflux disease, esophagitis presence not specified       PRAZOSIN HCL PO      Take 2 mg by mouth At Bedtime        SEROQUEL PO      Take 400 mg by mouth 2 qhs        sildenafil 50 MG tablet    VIAGRA    12 tablet    Take 0.5-1 tablets (25-50 mg) by mouth daily as needed for erectile dysfunction Take 30 min to 4 hours before intercourse.  Never use with nitroglycerin, terazosin or doxazosin.        traZODone 100 MG tablet    DESYREL     Take 2 tablets by mouth At Bedtime        venlafaxine 150 MG 24 hr capsule    EFFEXOR-XR     Take 2 capsules by mouth daily

## 2019-02-04 DIAGNOSIS — S83.281A TEAR OF LATERAL CARTILAGE OR MENISCUS OF KNEE, CURRENT, RIGHT, INITIAL ENCOUNTER: ICD-10-CM

## 2019-02-04 RX ORDER — NAPROXEN 500 MG/1
500 TABLET ORAL 2 TIMES DAILY WITH MEALS
Qty: 56 TABLET | Refills: 3 | Status: SHIPPED | OUTPATIENT
Start: 2019-02-04 | End: 2019-05-28

## 2019-02-11 DIAGNOSIS — K21.9 GASTROESOPHAGEAL REFLUX DISEASE, ESOPHAGITIS PRESENCE NOT SPECIFIED: ICD-10-CM

## 2019-04-18 ENCOUNTER — OFFICE VISIT (OUTPATIENT)
Dept: FAMILY MEDICINE | Facility: CLINIC | Age: 54
End: 2019-04-18
Payer: MEDICARE

## 2019-04-18 VITALS
RESPIRATION RATE: 24 BRPM | SYSTOLIC BLOOD PRESSURE: 119 MMHG | TEMPERATURE: 98.5 F | DIASTOLIC BLOOD PRESSURE: 82 MMHG | BODY MASS INDEX: 24.2 KG/M2 | HEART RATE: 91 BPM | WEIGHT: 154.2 LBS | OXYGEN SATURATION: 97 % | HEIGHT: 67 IN

## 2019-04-18 DIAGNOSIS — Z01.818 PRE-OP EXAM: Primary | ICD-10-CM

## 2019-04-18 DIAGNOSIS — F20.0 CHRONIC PARANOID SCHIZOPHRENIA (H): ICD-10-CM

## 2019-04-18 DIAGNOSIS — Z01.818 PREOP GENERAL PHYSICAL EXAM: Primary | ICD-10-CM

## 2019-04-18 LAB
BUN SERPL-MCNC: 10.8 MG/DL (ref 7–21)
CALCIUM SERPL-MCNC: 9 MG/DL (ref 8.5–10.1)
CHLORIDE SERPLBLD-SCNC: 102.5 MMOL/L (ref 98–110)
CO2 SERPL-SCNC: 28.3 MMOL/L (ref 20–32)
CREAT SERPL-MCNC: 1.3 MG/DL (ref 0.7–1.3)
GFR SERPL CREATININE-BSD FRML MDRD: 61.4 ML/MIN/1.7 M2
GLUCOSE SERPL-MCNC: 103.4 MG'DL (ref 70–99)
HCT VFR BLD AUTO: 39 % (ref 40–53)
HEMOGLOBIN: 12.3 G/DL (ref 13.3–17.7)
MCH RBC QN AUTO: 28.9 PG (ref 26.5–35)
MCHC RBC AUTO-ENTMCNC: 31.5 G/DL (ref 32–36)
MCV RBC AUTO: 91.6 FL (ref 78–100)
PLATELET # BLD AUTO: 298 K/UL (ref 150–450)
POTASSIUM SERPL-SCNC: 4.3 MMOL/DL (ref 3.2–4.6)
RBC # BLD AUTO: 4.3 M/UL (ref 4.4–5.9)
SODIUM SERPL-SCNC: 135 MMOL/L (ref 132–142)
WBC # BLD AUTO: 7.6 K/UL (ref 4–11)

## 2019-04-18 RX ORDER — ACETAMINOPHEN 500 MG
1000 TABLET ORAL 3 TIMES DAILY
Qty: 100 TABLET | Refills: 3 | Status: SHIPPED | OUTPATIENT
Start: 2019-04-18

## 2019-04-18 ASSESSMENT — PAIN SCALES - GENERAL: PAINLEVEL: WORST PAIN (10)

## 2019-04-18 ASSESSMENT — PATIENT HEALTH QUESTIONNAIRE - PHQ9: SUM OF ALL RESPONSES TO PHQ QUESTIONS 1-9: 0

## 2019-04-18 ASSESSMENT — MIFFLIN-ST. JEOR: SCORE: 1508.2

## 2019-04-18 NOTE — PATIENT INSTRUCTIONS
Presurgery Checklist  You are scheduled to have surgery. The healthcare staff will try to make your stay comfortable. Use the guidelines below to remind yourself what to do before surgery. Be sure to follow any specific pre-op instructions from your surgeon or nurse.   Preparing for Surgery  Ask your surgeon if you ll need a blood transfusion during surgery and if so, how to prepare for it. In some cases, you can donate blood before surgery. If needed, this blood can be given back (transfused) to you during or after surgery.  If you are having abdominal surgery, ask what you need to do to clear your bowel.  Tell your surgeon if you have allergies to any medications or foods.  Arrange for an adult family member or friend to drive you home after surgery. If possible, have someone ready to help you at home as you recover.  Call the surgeon if you get a cold, fever, sore throat, diarrhea, or other health problem just before surgery. Your surgeon can decide whether or not to postpone the surgery.  Medications  Tell your surgeon about all medications you take, including prescription and over-the-counter products such as herbal remedies and vitamins. Ask if you should continue taking them.  If you take ibuprofen, naproxen, or  blood thinners  such as aspirin, clopidogrel (Plavix), or warfarin (Coumadin), ask your surgeon whether you should stop taking them and how long before surgery you should stop.  You may be told to take antibiotics just before surgery to prevent infection. If so, follow instructions carefully on how to take them.  If you are told to take medications called anticoagulants to prevent blood clots after surgery, be sure to follow the instructions on how to take them.  Stop Smoking  If you smoke, healing may take longer. So at least 2 week(s) before surgery, stop smoking.  Bathing or Showering Before Surgery  If instructed, wash with antibacterial soap. Afterward, do not use lotions or powders.  If you  are having surgery on the head, you may be asked to shampoo with antibacterial soap. Follow instructions for doing so.  Do Not Remove Hair from the Surgery Site  Do not shave hair from the incision site, unless you are given specific instructions to do so. Usually, if hair needs to be removed, it will be done at the hospital right before surgery.  Don t Eat or Drink  Your doctor will tell you when to stop eating and drinking. If you do not follow your doctor's instructions, your procedure may be postponed or rescheduled for another day.  If your surgeon tells you to continue any medications, take them with small sips of water.  You can brush your teeth and rinse your mouth, but don t swallow any water.  Day of Surgery  Do not wear makeup. Do not use perfume, deodorant, or hairspray. Remove nail polish and artificial nails.  Leave jewelry (including rings), watches, and other valuables at home.  Be sure to bring health insurance cards or forms and a photo ID.  Bring a list of your medications (include the name, dose, how often you take them, and the time last dose was taken).  Arrive on time at the hospital or surgery facility.  Presurgery Checklist  You are scheduled to have surgery. The healthcare staff will try to make your stay comfortable. Use the guidelines below to remind yourself what to do before surgery. Be sure to follow any specific pre-op instructions from your surgeon or nurse.   Preparing for Surgery  Ask your surgeon if you ll need a blood transfusion during surgery and if so, how to prepare for it. In some cases, you can donate blood before surgery. If needed, this blood can be given back (transfused) to you during or after surgery.  If you are having abdominal surgery, ask what you need to do to clear your bowel.  Tell your surgeon if you have allergies to any medications or foods.  Arrange for an adult family member or friend to drive you home after surgery. If possible, have someone ready to help  you at home as you recover.  Call the surgeon if you get a cold, fever, sore throat, diarrhea, or other health problem just before surgery. Your surgeon can decide whether or not to postpone the surgery.  Medications  Tell your surgeon about all medications you take, including prescription and over-the-counter products such as herbal remedies and vitamins. Ask if you should continue taking them.  If you take ibuprofen, naproxen, or  blood thinners  such as aspirin, clopidogrel (Plavix), or warfarin (Coumadin), ask your surgeon whether you should stop taking them and how long before surgery you should stop.  You may be told to take antibiotics just before surgery to prevent infection. If so, follow instructions carefully on how to take them.  If you are told to take medications called anticoagulants to prevent blood clots after surgery, be sure to follow the instructions on how to take them.  Stop Smoking  If you smoke, healing may take longer. So at least 2 week(s) before surgery, stop smoking.  Bathing or Showering Before Surgery  If instructed, wash with antibacterial soap. Afterward, do not use lotions or powders.  If you are having surgery on the head, you may be asked to shampoo with antibacterial soap. Follow instructions for doing so.  Do Not Remove Hair from the Surgery Site  Do not shave hair from the incision site, unless you are given specific instructions to do so. Usually, if hair needs to be removed, it will be done at the hospital right before surgery.  Don t Eat or Drink  Your doctor will tell you when to stop eating and drinking. If you do not follow your doctor's instructions, your procedure may be postponed or rescheduled for another day.  If your surgeon tells you to continue any medications, take them with small sips of water.  You can brush your teeth and rinse your mouth, but don t swallow any water.  Day of Surgery  Do not wear makeup. Do not use perfume, deodorant, or hairspray. Remove nail  polish and artificial nails.  Leave jewelry (including rings), watches, and other valuables at home.  Be sure to bring health insurance cards or forms and a photo ID.  Bring a list of your medications (include the name, dose, how often you take them, and the time last dose was taken).  Arrive on time at the hospital or surgery facility.  Presurgery Checklist  You are scheduled to have surgery. The healthcare staff will try to make your stay comfortable. Use the guidelines below to remind yourself what to do before surgery. Be sure to follow any specific pre-op instructions from your surgeon or nurse.   Preparing for Surgery  Ask your surgeon if you ll need a blood transfusion during surgery and if so, how to prepare for it. In some cases, you can donate blood before surgery. If needed, this blood can be given back (transfused) to you during or after surgery.  If you are having abdominal surgery, ask what you need to do to clear your bowel.  Tell your surgeon if you have allergies to any medications or foods.  Arrange for an adult family member or friend to drive you home after surgery. If possible, have someone ready to help you at home as you recover.  Call the surgeon if you get a cold, fever, sore throat, diarrhea, or other health problem just before surgery. Your surgeon can decide whether or not to postpone the surgery.  Medications  Tell your surgeon about all medications you take, including prescription and over-the-counter products such as herbal remedies and vitamins. Ask if you should continue taking them.  If you take ibuprofen, naproxen, or  blood thinners  such as aspirin, clopidogrel (Plavix), or warfarin (Coumadin), ask your surgeon whether you should stop taking them and how long before surgery you should stop.  You may be told to take antibiotics just before surgery to prevent infection. If so, follow instructions carefully on how to take them.  If you are told to take medications called  anticoagulants to prevent blood clots after surgery, be sure to follow the instructions on how to take them.  Stop Smoking  If you smoke, healing may take longer. So at least 2 week(s) before surgery, stop smoking.  Bathing or Showering Before Surgery  If instructed, wash with antibacterial soap. Afterward, do not use lotions or powders.  If you are having surgery on the head, you may be asked to shampoo with antibacterial soap. Follow instructions for doing so.  Do Not Remove Hair from the Surgery Site  Do not shave hair from the incision site, unless you are given specific instructions to do so. Usually, if hair needs to be removed, it will be done at the hospital right before surgery.  Don t Eat or Drink  Your doctor will tell you when to stop eating and drinking. If you do not follow your doctor's instructions, your procedure may be postponed or rescheduled for another day.  If your surgeon tells you to continue any medications, take them with small sips of water.  You can brush your teeth and rinse your mouth, but don t swallow any water.  Day of Surgery  Do not wear makeup. Do not use perfume, deodorant, or hairspray. Remove nail polish and artificial nails.  Leave jewelry (including rings), watches, and other valuables at home.  Be sure to bring health insurance cards or forms and a photo ID.  Bring a list of your medications (include the name, dose, how often you take them, and the time last dose was taken).  Arrive on time at the hospital or surgery facility.

## 2019-04-18 NOTE — NURSING NOTE
Chief Complaint   Patient presents with     Pre-Op Exam     Bunionectomy both feet (Right foot first)/ Date and Location (Hospital) unknown by patient.     Gray Gage, CMA

## 2019-04-18 NOTE — PROGRESS NOTES
[address] [address]  Phone: [phone]  Fax: [phone]    4/18/2019    Adult PRE-OP Evaluation:    Missael Garcia, 1965 presents for pre-operative evaluation and assessment as requested, prior to undergoing surgery/procedure for treatment of  Bunion of the right foot .    Proposed procedure: Bunionectomy.    Date of Surgery/ Procedure: Date not set  Hospital/Surgical Facility: Not known to pt (pt has a )     Primary Physician: Tariq Guzman  Type of Anesthesia Anticipated: Local with MAC  History of anesthesia complications: NONE  History of  abnormal bleeding: NONE   History of blood transfusions: NO  Patient has a Health Care Directive or Living Will:  NO    Preoperative Questions   1. NO - Do you have a history of heart attack, stroke, stent, bypass or surgery on an artery in the head, neck, heart or legs?  2. NO - Do you ever have any pain or discomfort in your chest?  3. NO - Have you ever had a severe pain across the front of your chest lasting for half an hour or more?  4. NO - Do you have a history of Congestive Heart Failure?  5. NO - Are you troubled by shortness of breath when: walking on the level/ up a slight hill/ at night?  6. NO - Does your chest ever sound wheezy or whistling?  7. NO - Do you currently have a cold, bronchitis or other respiratory infection?  8. NO - Have you had a cold, bronchitis or other respiratory infection within the last 2 weeks?  9. NO - Do you usually have a cough?  10. NO - Do you sometimes get pains in the calves of your legs when you walk?  11. NO - Do you or anyone in your family have previous history of blood clots?  12. NO - Do you or does anyone in your family have a serious bleeding problem such as prolonged bleeding following surgeries or cuts?  13. NO - Have you ever had problems with anemia or been told to take iron pills?  14. NO - Have you had any abnormal blood loss such as black, tarry or bloody stools, or  abnormal vaginal bleeding?  15. NO - Have you ever had a blood transfusion?  16. NO - Have you or any of your relatives ever had problems with anesthesia?  17. NO - Do you have sleep apnea, excessive snoring or daytime drowsiness?  18. NO - Do you have any prosthetic heart valves?  19. NO - Do you have prosthetic joints?  20. NO - Is there any chance that you may be pregnant?    Patient Active Problem List   Diagnosis     Benign neoplasm of colon     Chronic paranoid schizophrenia (H)     Globus hystericus     Orthostatic hypotension     Erectile dysfunction, unspecified erectile dysfunction type         Current Outpatient Medications on File Prior to Visit:  benztropine (COGENTIN) 1 MG tablet Take 1 mg by mouth At Bedtime   BUPROPION HCL PO    CLONIDINE HCL PO Take 0.1 mg by mouth as needed Take every morning and 1-2 prn for impulsivity.   haloperidol (HALDOL) 5 MG tablet Take 5 mg by mouth At Bedtime   naproxen (NAPROSYN) 500 MG tablet TAKE 1 TABLET (500 MG) BY MOUTH 2 TIMES DAILY (WITH MEALS)   omeprazole (PRILOSEC) 20 MG DR capsule Take 1 capsule (20 mg) by mouth daily   PRAZOSIN HCL PO Take 2 mg by mouth At Bedtime   QUEtiapine Fumarate (SEROQUEL PO) Take 400 mg by mouth 2 qhs   sildenafil (REVATIO/VIAGRA) 50 MG cap/tab Take 0.5-1 tablets (25-50 mg) by mouth daily as needed for erectile dysfunction Take 30 min to 4 hours before intercourse.  Never use with nitroglycerin, terazosin or doxazosin.   traZODone (DESYREL) 100 MG tablet Take 2 tablets by mouth At Bedtime   venlafaxine (EFFEXOR-XR) 150 MG 24 hr capsule Take 2 capsules by mouth daily     No current facility-administered medications on file prior to visit.     OTC products: None, except as noted above    No Known Allergies  Latex Allergy: NO    Social History     Socioeconomic History     Marital status:      Spouse name: None     Number of children: None     Years of education: None     Highest education level: None   Occupational History      "None   Social Needs     Financial resource strain: None     Food insecurity:     Worry: None     Inability: None     Transportation needs:     Medical: None     Non-medical: None   Tobacco Use     Smoking status: Former Smoker     Packs/day: 0.50     Types: Cigarettes     Last attempt to quit: 2/1/2016     Years since quitting: 3.2     Smokeless tobacco: Never Used   Substance and Sexual Activity     Alcohol use: No     Alcohol/week: 0.0 oz     Drug use: No     Sexual activity: Yes     Partners: Female   Lifestyle     Physical activity:     Days per week: None     Minutes per session: None     Stress: None   Relationships     Social connections:     Talks on phone: None     Gets together: None     Attends Muslim service: None     Active member of club or organization: None     Attends meetings of clubs or organizations: None     Relationship status: None     Intimate partner violence:     Fear of current or ex partner: None     Emotionally abused: None     Physically abused: None     Forced sexual activity: None   Other Topics Concern     Parent/sibling w/ CABG, MI or angioplasty before 65F 55M? Not Asked   Social History Narrative     None       REVIEW OF SYSTEMS:   Constitutional, HEENT, cardiovascular, pulmonary, gi and gu systems are negative, except as otherwise noted.    EXAM:     Patient Vitals for the past 24 hrs:   BP Temp Temp src Pulse Resp SpO2 Height Weight   04/18/19 1030 119/82 98.5  F (36.9  C) Oral 91 24 97 % 1.71 m (5' 7.32\") 69.9 kg (154 lb 3.2 oz)     Body mass index is 23.92 kg/m .  GENERAL: healthy, alert and no distress  EYES: Eyes grossly normal to inspection, extraocular movements - intact, and PERRL  HENT: ear canals- normal; TMs- normal; Nose- normal; Mouth- no ulcers, no lesions  NECK: no tenderness, no adenopathy, no asymmetry, no masses, no stiffness; thyroid- normal to palpation  RESP: lungs clear to auscultation - no rales, no rhonchi, no wheezes  CV: regular rates and rhythm, " normal S1 S2, no S3 or S4 and no murmur, no click or rub -  ABDOMEN: soft, no tenderness, no  hepatosplenomegaly, no masses, normal bowel sounds  MS: extremities- no gross deformities noted, no edema  SKIN: no suspicious lesions, no rashes  NEURO: strength and tone- normal, sensory exam- grossly normal, mentation- intact, speech- normal, reflexes- symmetric  BACK: no CVA tenderness, no paralumbar tenderness  PSYCH: Alert and oriented times 3; speech- coherent , normal rate and volume; able to articulate logical thoughts  LYMPHATICS: ant. cervical- normal, post. cervical- normal    DIAGNOSTICS:      No labs or EKG required for low risk surgery (cataract, skin procedure, breast biopsy, etc)    RISK ASSESSMENT:     Cardiovascular Risk:  -Patient is able to participate in strenuous activities without chest pain.  -The patient does not have chest pain with exertion.  -Patient does not have a history of congestive heart failure.    -The patient does not have a history of stroke and does not have a history of valvular disease.    Pulmonary Risk:  -In terms of risk factors for pulmonary complication, the patient has no risk factors    Perioperative Complications:  -The patient does not have a history of bleeding or clotting problems in the past.    -The patient has not had complications from surgeries.    -The patient does not have a family history of any anesthesia or surgical complications.      IMPRESSION:   Reason for surgery/procedure: Bunion    The proposed surgical procedure is considered LOW risk.    For above listed surgery and anesthesia:   Patient is at low risk for surgery/procedure and perioperative/procedure complications.    RECOMMENDATIONS:     Labs:  Ordered.    Fasting:  NPO for 12 hours prior to surgery    Preop Plan:  --Approval given to proceed with proposed procedure, without further diagnostic evaluation    Medications:  Patient should take their regular medications the morning of surgery unless  otherwise instructed.    Hold ibuprofen for 5 days prior.  Take Tylenol instead for pain.      Tariq Guzman MD    Please contact our office if there are any further questions or information required about this patient.

## 2019-04-18 NOTE — LETTER
April 19, 2019      Missael Garcia  [address]  [place] [address]        Dear Missael,    Please see below for your test results.    Resulted Orders   Basic Metabolic Panel ([facility])  - Results < 1 hr   Result Value Ref Range    Urea Nitrogen 10.8 7.0 - 21.0 mg/dL    Calcium 9.0 8.5 - 10.1 mg/dL    Chloride 102.5 98.0 - 110.0 mmol/L    Carbon Dioxide 28.3 20.0 - 32.0 mmol/L    Creatinine 1.3 0.7 - 1.3 mg/dL    Glucose 103.4 (H) 70.0 - 99.0 mg'dL    Potassium 4.3 3.2 - 4.6 mmol/dL    Sodium 135.0 132.0 - 142.0 mmol/L    GFR Estimate 61.4 >60.0 mL/min/1.7 m2    GFR Estimate If Black 74.3 >60.0 mL/min/1.7 m2   CBC with Plt ([facility])   Result Value Ref Range    WBC 7.6 4.0 - 11.0 K/uL    RBC 4.3 (L) 4.4 - 5.9 M/uL    Hemoglobin 12.3 (L) 13.3 - 17.7 g/dL    Hematocrit 39.0 (L) 40.0 - 53.0 %    MCV 91.6 78.0 - 100.0 fL    MCH 28.9 26.5 - 35.0    MCHC 31.5 (L) 32.0 - 36.0 g/dL    Platelets 298.0 150.0 - 450.0 K/uL       These results are within the normal range for you.  Please follow up in the clinic as directed.    If you have any questions, please call the clinic to make an appointment.    Sincerely,    Tariq Guzman MD

## 2019-05-08 ENCOUNTER — MYC MEDICAL ADVICE (OUTPATIENT)
Dept: FAMILY MEDICINE | Facility: CLINIC | Age: 54
End: 2019-05-08

## 2019-05-08 DIAGNOSIS — M21.619 BUNION: ICD-10-CM

## 2019-05-08 DIAGNOSIS — R09.A2 GLOBUS HYSTERICUS: Primary | ICD-10-CM

## 2019-05-08 DIAGNOSIS — F20.0 CHRONIC PARANOID SCHIZOPHRENIA (H): ICD-10-CM

## 2019-05-14 ENCOUNTER — TELEPHONE (OUTPATIENT)
Dept: FAMILY MEDICINE | Facility: CLINIC | Age: 54
End: 2019-05-14

## 2019-05-14 NOTE — TELEPHONE ENCOUNTER
[facility] phone call message- general phone call:    Reason for call: She needs some information re his home nursing referral.    Return call needed: Yes    OK to leave a message on voice mail? Yes    Primary language: English      needed? No    Call taken on May 14, 2019 at 2:57 PM by Araceli King

## 2019-05-14 NOTE — TELEPHONE ENCOUNTER
Spoke with Ai from [facility].  She is trying to expedite the home nursing referral and was wondering who the agency will be that will be seeing patient.  She states that she did speak to someone here and they said either [facility] or [facility].  She was wondering if referrals would know where it has been determined that patient will be going.    Please call her back at [phone]    Routed to Referrals/Dr. Guzman/TY Rivas RN

## 2019-05-15 ENCOUNTER — HOME CARE/HOSPICE - HEALTHEAST (OUTPATIENT)
Dept: HOME HEALTH SERVICES | Facility: HOME HEALTH | Age: 54
End: 2019-05-15

## 2019-05-15 NOTE — TELEPHONE ENCOUNTER
Faxed over Face sheet, Referral, medication list, and last office visit to [facility].   Chasity Castelan, RMA  5/15/19     Also called Ai BURROUGHS informing her that I sent over the referral to [facility] they do get 48 hours to evaluate and reach out to the patient. If she has any questions she can contact me at the clinic.

## 2019-05-16 ENCOUNTER — TELEPHONE (OUTPATIENT)
Dept: FAMILY MEDICINE | Facility: CLINIC | Age: 54
End: 2019-05-16

## 2019-05-16 NOTE — TELEPHONE ENCOUNTER
Spoke with Adilene and she states that pt's current phone number is [phone].  Adilene has been trying to get pt services but has difficulty finding him and getting him to follow through on things.  She states that he has not been following instructions and staying off of his foot.  She states that when she went to see him he came to the door walking on his foot when he is suppose to be non weight bearing since he had the second bunionectomy fusion.     Called pt and he is open to having HHN.  Told him that I would have [facility] call him to schedule appt.    Called [facility] and they will call him to set up home visit for Sat, 5/18/19.    Gave info to Adilene./DARIEN

## 2019-05-16 NOTE — TELEPHONE ENCOUNTER
Sent My Chart message to pt to see if there is another phone number for [facility] to try to reach him./DARIEN

## 2019-05-16 NOTE — TELEPHONE ENCOUNTER
[facility] phone call message- general phone call:    Reason for call: Emergency contact and number on file are incorrect for this patient.  They are unable to open patient to Home Care due to not being able to contact him.    Return call needed: No    OK to leave a message on voice mail? No    Primary language: English      needed? No    Call taken on May 16, 2019 at 10:07 AM by Kun Gonsales

## 2019-05-16 NOTE — TELEPHONE ENCOUNTER
Missael Garcia Nicole A, RN   Phone Number: [phone]             ----- Message from [employer] sent at 5/16/2019 11:20 AM CDT -----     Hi, this is Missael Salgado's RN/ with [employer]  I have 7 different phone numbers for Missael, and I'm not sure which oneyou are trying.  Please give me a call at [phone] and I can update you on his situation.

## 2019-05-20 ENCOUNTER — TELEPHONE (OUTPATIENT)
Dept: FAMILY MEDICINE | Facility: CLINIC | Age: 54
End: 2019-05-20

## 2019-05-28 DIAGNOSIS — S83.281A TEAR OF LATERAL CARTILAGE OR MENISCUS OF KNEE, CURRENT, RIGHT, INITIAL ENCOUNTER: ICD-10-CM

## 2019-05-28 RX ORDER — NAPROXEN 500 MG/1
500 TABLET ORAL 2 TIMES DAILY WITH MEALS
Qty: 56 TABLET | Refills: 3 | Status: SHIPPED | OUTPATIENT
Start: 2019-05-28 | End: 2019-09-17

## 2019-12-16 DIAGNOSIS — K21.9 GASTROESOPHAGEAL REFLUX DISEASE, ESOPHAGITIS PRESENCE NOT SPECIFIED: Primary | ICD-10-CM

## 2019-12-16 DIAGNOSIS — R10.13 EPIGASTRIC PAIN: ICD-10-CM

## 2020-01-07 DIAGNOSIS — K21.9 GASTROESOPHAGEAL REFLUX DISEASE, ESOPHAGITIS PRESENCE NOT SPECIFIED: ICD-10-CM

## 2020-03-10 ENCOUNTER — HEALTH MAINTENANCE LETTER (OUTPATIENT)
Age: 55
End: 2020-03-10

## 2020-03-26 DIAGNOSIS — K21.9 GASTROESOPHAGEAL REFLUX DISEASE, ESOPHAGITIS PRESENCE NOT SPECIFIED: ICD-10-CM

## 2020-07-17 DIAGNOSIS — K21.9 GASTROESOPHAGEAL REFLUX DISEASE, ESOPHAGITIS PRESENCE NOT SPECIFIED: ICD-10-CM

## 2020-08-11 ENCOUNTER — TRANSFERRED RECORDS (OUTPATIENT)
Dept: HEALTH INFORMATION MANAGEMENT | Facility: CLINIC | Age: 55
End: 2020-08-11

## 2020-09-23 ENCOUNTER — OFFICE VISIT (OUTPATIENT)
Dept: FAMILY MEDICINE | Facility: CLINIC | Age: 55
End: 2020-09-23
Payer: MEDICARE

## 2020-09-23 ENCOUNTER — RECORDS - HEALTHEAST (OUTPATIENT)
Dept: ADMINISTRATIVE | Facility: OTHER | Age: 55
End: 2020-09-23

## 2020-09-23 VITALS
HEIGHT: 67 IN | OXYGEN SATURATION: 98 % | HEART RATE: 98 BPM | RESPIRATION RATE: 16 BRPM | WEIGHT: 130.8 LBS | DIASTOLIC BLOOD PRESSURE: 81 MMHG | SYSTOLIC BLOOD PRESSURE: 123 MMHG | TEMPERATURE: 98.7 F | BODY MASS INDEX: 20.53 KG/M2

## 2020-09-23 DIAGNOSIS — F20.0 CHRONIC PARANOID SCHIZOPHRENIA (H): Primary | ICD-10-CM

## 2020-09-23 DIAGNOSIS — Z12.5 SCREENING FOR PROSTATE CANCER: ICD-10-CM

## 2020-09-23 DIAGNOSIS — D64.9 ANEMIA, UNSPECIFIED TYPE: ICD-10-CM

## 2020-09-23 DIAGNOSIS — R63.4 WEIGHT LOSS: ICD-10-CM

## 2020-09-23 DIAGNOSIS — Z23 NEED FOR PROPHYLACTIC VACCINATION AND INOCULATION AGAINST INFLUENZA: ICD-10-CM

## 2020-09-23 DIAGNOSIS — N52.9 ERECTILE DYSFUNCTION, UNSPECIFIED ERECTILE DYSFUNCTION TYPE: ICD-10-CM

## 2020-09-23 LAB
% GRANULOCYTES: 64.2 %G (ref 40–75)
FERRITIN SERPL-MCNC: 6 NG/ML (ref 27–300)
FOLATE SERPL-MCNC: 7.5 NG/ML
GRANULOCYTES #: 4.2 K/UL (ref 1.6–8.3)
HCT VFR BLD AUTO: 33.8 % (ref 40–53)
HEMOGLOBIN: 10.3 G/DL (ref 13.3–17.7)
HIV 1+2 AB+HIV1 P24 AG SERPL QL IA: NEGATIVE
IRON SATN MFR SERPL: 7 % (ref 20–50)
IRON SERPL-MCNC: 23 UG/DL (ref 42–175)
LYMPHOCYTES # BLD AUTO: 1.9 K/UL (ref 0.8–5.3)
LYMPHOCYTES NFR BLD AUTO: 29.2 %L (ref 20–48)
MCH RBC QN AUTO: 27.5 PG (ref 26.5–35)
MCHC RBC AUTO-ENTMCNC: 30.5 G/DL (ref 32–36)
MCV RBC AUTO: 90.5 FL (ref 78–100)
MID #: 0.4 K/UL (ref 0–2.2)
MID %: 6.6 %M (ref 0–20)
PLATELET # BLD AUTO: 603 K/UL (ref 150–450)
PSA SERPL-MCNC: 0.6 NG/ML (ref 0–3.5)
RBC # BLD AUTO: 3.7 M/UL (ref 4.4–5.9)
RETICS # AUTO: 0.05 MILL/UL (ref 0.01–0.11)
RETICS/RBC NFR AUTO: 1.48 % (ref 0.8–2.7)
TIBC SERPL-MCNC: 314 UG/DL (ref 313–563)
TRANSFERRIN SERPL-MCNC: 251 MG/DL (ref 212–360)
TSH SERPL DL<=0.05 MIU/L-ACNC: 0.63 UIU/ML (ref 0.3–5)
VIT B12 SERPL-MCNC: 735 PG/ML (ref 213–816)
WBC # BLD AUTO: 6.5 K/UL (ref 4–11)

## 2020-09-23 RX ORDER — SILDENAFIL 50 MG/1
25-50 TABLET, FILM COATED ORAL DAILY PRN
Qty: 12 TABLET | Refills: 11 | Status: SHIPPED | OUTPATIENT
Start: 2020-09-23

## 2020-09-23 ASSESSMENT — MIFFLIN-ST. JEOR: SCORE: 1391.93

## 2020-09-23 NOTE — LETTER
September 25, 2020      Missael Garcia  [address]  [place] [address]        Dear ,    We are writing to inform you of your test results.    Your blood tests show that you are quite anemic.  I think you need to see a specialist.  I will put in a referral for that.     Resulted Orders   Iron Transferrin Saturat ([facility])   Result Value Ref Range    Iron 23 (L) 42 - 175 ug/dL    Transferrin 251 212 - 360 mg/dL    Transferrin Saturation 7 (L) 20 - 50 %    Transferrin  313 - 563 ug/dL    Narrative    Test performed by:  [facility]  [address] [place] [address]   Ferritin ([facility])   Result Value Ref Range    Ferritin 6 (L) 27 - 300 ng/mL    Narrative    Test performed by:  [facility]  [address] [place] [address]   CBC with Diff Plt ([facility])   Result Value Ref Range    WBC 6.5 4.0 - 11.0 K/uL    Lymphocytes # 1.9 0.8 - 5.3 K/uL    % Lymphocytes 29.2 20.0 - 48.0 %L    Mid # 0.4 0.0 - 2.2 K/uL    Mid % 6.6 0.0 - 20.0 %M    GRANULOCYTES # 4.2 1.6 - 8.3 K/uL    % Granulocytes 64.2 40.0 - 75.0 %G    RBC 3.7 (L) 4.4 - 5.9 M/uL    Hemoglobin 10.3 (L) 13.3 - 17.7 g/dL    Hematocrit 33.8 (L) 40.0 - 53.0 %    MCV 90.5 78.0 - 100.0 fL    MCH 27.5 26.5 - 35.0    MCHC 30.5 (L) 32.0 - 36.0 g/dL    Platelets 603.0 (H) 150.0 - 450.0 K/uL   Reticulocyte Count  Abs ([facility])   Result Value Ref Range    Retic (Absolute) 0.054 0.010 - 0.110 mill/uL    Reticulocyte Count PCT 1.48 0.8 - 2.7 %    Narrative    Test performed by:  [facility]  [address] [place] [address]   Morphology Periph. Blood ([facility])   Result Value Ref Range    Morphology, Peripheral Blood See Separate Pathology Report (A) (none)    WBC 6.4 4.0 - 11.0 thou/uL    RBC 3.67 (L) 4.40 - 6.20 mill/uL    Hemoglobin 9.6 (L) 14.0 - 18.0 g/dL    Hematocrit 31.7 (L) 40.0 - 54.0 %    MCV 86 80 - 100 fL    MCH 26.2 (L) 27.0 - 34.0 pg    MCHC 30.3 (L) 32.0 - 36.0 g/dL    RDW 15.6 (H) 11.0 - 14.5 %     Platelets 569 (H) 140 - 440 thou/uL    Mean Platelet Volume 8.8 8.5 - 12.5 fL    % Neutrophils 63 50 - 70 %    % Lymphocytes 27 20 - 40 %    % Monocytes 7 2 - 10 %    % Eosinophils 3 0 - 6 %    % Basophils 0 0 - 2 %    Neutrophils (Absolute) 4.0 2.0 - 7.7 thou/uL    Lymphs (Absolute) 1.8 0.8 - 4.4 thou/uL    Monocytes(Absolute) 0.5 0.0 - 0.9 thou/uL    Eos (Absolute) 0.2 0.0 - 0.4 thou/uL    Baso (Absolute) 0.0 0.0 - 0.2 thou/uL    Narrative    Test performed by:  [facility] [address] [place] [address]   Vitamin B12 ([facility])   Result Value Ref Range    Vitamin B12 735 213 - 816 pg/mL    Narrative    Test performed by:  [facility] [address] [place] [address]   Folate  Serum ([facility])   Result Value Ref Range    Folate 7.5 >=3.5 ng/mL    Narrative    Test performed by:  [facility] [address] [place] [address]   Thyroid [place] ([facility])   Result Value Ref Range    TSH 0.63 0.30 - 5.00 uIU/mL    Narrative    Test performed by:  [facility] [address] [place] [address]   HIV Ag/Ab Screen [place] ([facility])   Result Value Ref Range    HIV Antigen/Antibody Negative Negative    Narrative    Test performed by:  [facility] [address] [place] [address]  Method is Abbott HIV Ag/Ab for the detection of HIV p24 antigen, HIV-1   antibodies and HIV-2 antibodies.   PSA Screening ([facility])   Result Value Ref Range    PSA 0.6 0.0 - 3.5 ng/mL    Narrative    Test performed by:  [facility] [address] [place] [address]  Method is Abbott Prostate-Specific Antigen (PSA)  Standard-WHO 1st International (90:10)   Manual Diff ([facility])   Result Value Ref Range    Platelet Estimate Increased (A) Normal    Ovalocytes 1+ (A) Negative    Narrative    Test performed by:  [facility] [address] [place] [address]   Perip Blood Morph ([facility])   Result Value Ref Range    Lab AP Case Report SEE RESULTS BELOW        Comment:      Peripheral Blood Morphology Report                Case: [id number]                                     Authorizing Provider:  Tariq Guzman MD       Collected:             09/23/2020 0902              Ordering Location:      [facility] Received:              09/24/2020 0732              Pathologist:           Leopoldo Marie MD                                                          Specimen:    Peripheral Blood                                                                             Lab AP Final Diagnosis SEE RESULTS BELOW       Comment:      PERIPHERAL BLOOD:    -  HYPOCHROMIC ANEMIA (HISTORY OF IRON DEFICIENCY)    -  THROMBOCYTOSIS  Electronically signed by Leopoldo Marie MD on 9/24/2020 at 12:25 PM      Lab AP Diagnosis Comment SEE RESULTS BELOW       Comment:      The complex clinical history has been reviewed. Although there is likely a   component of artifact, the red blood cell morphology certainly suggests   consideration for at least a minor component of hemolysis.   Although other causes of ineffective erythropoiesis should be ruled out, the   features are suggestive of an underlying disorder of globin synthesis versus   anemia of chronic disease, and hemoglobin studies can be performed on the   current specimen, if requested. Please correlate with family history and iron   studies, if appropriate.  Thrombocytosis can be a reaction to hemorrhage, iron deficiency, post   splenectomy, chronic inflammatory states, malignancies, or drugs (vincristine,   high-dose erythropoietin) or due to an intrinsic stem cell defect (e.g.,   essential thrombocythemia). Please correlate with clinical findings.  If there is clinical suspicion for a myeloproliferative neoplasm, JAK2 (Janus   Kinase 2 gene), MPL (thrombopoietin receptor gene), and CALR (calretic  ulin)   mutation analysis can be performed on a subsequent peripheral blood specimen.   The JAK2 V617F is present in 95% to  98% of polycythemia vera, 50% to 60% of   primary myelofibrosis (PMF), and 50% to 60% of essential thrombocythemia (ET).   It has also been described infrequently in other myeloid neoplasms, including   chronic myelomonocytic leukemia and myelodysplastic syndrome. This mutation   is not seen in chronic myelogenous leukemia (CML) or in reactive conditions   with elevated blood counts. Detection of the JAK2 V617F is useful to help   establish the diagnosis of MPN. However, a negative JAK2 V617F result does not   indicate absence of an MPN. Other important molecular markers in   [id number] MPN include CALR exon 9 mutation (20%-30% of PMF and ET) and   MPL exon 10 mutation (5%-10% of PMF and 3%-5% of ET). Mutations in JAK2,   CALR, and MPL are essentially mutually exclusive.      Lap AP Peripheral Smear SEE RESULTS BELOW       Comment:      Red blood cells are decreased in number and overall hypochromic and   normocytic. Anisopoikilocytosis and polychromasia are increased but rouleaux   formation does not appear prominent.   The white blood cell count and differential appear as reported on the CBC.   Leukocytes are normal in number and appearance, consisting predominantly of   segmented and band neutrophils with fewer numbers of lymphocytes and   monocytes. No blasts or dysplastic changes are identified.  Platelets are increased in number but overall normal in appearance.      Lab AP Charges SEE RESULTS BELOW       Comment:      CPT:   53322  ICD10: D64.9      Narrative    Test performed by:  [facility] [address] [place] [address]       If you have any questions or concerns, please call the clinic at the number listed above.       Sincerely,        Tariq Guzman MD

## 2020-09-23 NOTE — PROGRESS NOTES
"Missael Garcia comes in today for the following concerns:    1) Weight loss.  Weight was 164 in Aug 2018, 154 in April 2019 and 130 today.  No symptoms reported by patient.  Recently found to be anemic of unclear etiology.  Never had a PSA.  Last colonoscopy was 2016 = polyps with recommended f/u in 2021.  Pt quit smoking 3 years ago.  Recently at [facility] and had relatively unremarkable CT abd beyond esophagitis.  Had an EGD = esophagitis and now on a PPI.    2)  Wants a refill of Viagra.    3) He follows with a mental health provider who manages his schizophrenia.  He sees that provider every 2-3 months.  His name is Dr. Paco Bran (sp??).   His  from [place] would like him to have a neuropsychiatric eval.  Apparently this is been recommended by the psychiatrist as well.    Allergies, medications and problem list reviewed and updated as needed in [facility].      REVIEW OF SYSTEMS    General: No fevers  Head: No headache  Neck: He is having swallowing problems (recently diagnosed with esophagitis) and this is not better yet  CV: No chest pain or palpitations  Resp: No shortness of breath.  No cough. No hemoptysis.  GI: He has a hard BM every few days.  No nausea or vomiting  : No nocturia    /81   Pulse 98   Temp 98.7  F (37.1  C) (Oral)   Resp 16   Ht 1.702 m (5' 7\")   Wt 59.3 kg (130 lb 12.8 oz)   SpO2 98%   BMI 20.49 kg/m        Gen:  Well nourished and in NAD  HEENT: PERRLA; TMs normal color and landmarks on the right, some cerumen on the left; nasopharynx pink and moist; oropharynx pink and moist  Neck: supple without lymphadenopathy  CV:  RRR  - no murmurs, rubs, or gallups,   Pulm:  CTAB, no wheezes/rales/rhonchi, good air entry   ABD: soft, nontender, no masses, no rebound, BS intact throughout  Extrem: no cyanosis, edema or clubbing  Skin: No rash  Psych: Euthymic     ASSESSMENT and PLAN:  1. Chronic paranoid schizophrenia (H)      2. Erectile dysfunction, unspecified erectile dysfunction " type      3. Anemia, unspecified type      4. Weight loss    We need to investigate the significant weight loss and the anemia that this patient has.  We will check appropriate labs (some done at [facility] in August and will not be repeated) and follow up in two weeks.  Should the initial labs be unrevealing we need to continue to pursue causes of unexpected weight loss in a stepwise fashion including chest x-ray, colonoscopy, etc.        Total of 45 minutes was spent in face to face contact with patient with > 50% in counseling and coordination of care.  Options for treatment and/or follow-up care were reviewed with the patient/family who was engaged and actively involved in the decision making process and who verbalized understanding of the options discussed and was satisfied with the final plan.    RTC in 2 weeks for follow up of weight loss or sooner if develops new or worsening symptoms.    Tariq Guzman

## 2020-09-24 ENCOUNTER — RECORDS - HEALTHEAST (OUTPATIENT)
Dept: ADMINISTRATIVE | Facility: OTHER | Age: 55
End: 2020-09-24

## 2020-09-24 ENCOUNTER — TELEPHONE (OUTPATIENT)
Dept: FAMILY MEDICINE | Facility: CLINIC | Age: 55
End: 2020-09-24

## 2020-09-24 DIAGNOSIS — D64.9 ANEMIA, UNSPECIFIED TYPE: Primary | ICD-10-CM

## 2020-09-24 DIAGNOSIS — D50.0 IRON DEFICIENCY ANEMIA DUE TO CHRONIC BLOOD LOSS: ICD-10-CM

## 2020-09-24 LAB
BASOPHILS # BLD AUTO: 0 THOU/UL (ref 0–0.2)
BASOPHILS NFR BLD AUTO: 0 % (ref 0–2)
EOSINOPHIL # BLD AUTO: 0.2 THOU/UL (ref 0–0.4)
EOSINOPHIL NFR BLD AUTO: 3 % (ref 0–6)
ERYTHROCYTE [DISTWIDTH] IN BLOOD BY AUTOMATED COUNT: 15.6 % (ref 11–14.5)
HCT VFR BLD AUTO: 31.7 % (ref 40–54)
HGB BLD-MCNC: 9.6 G/DL (ref 14–18)
LAB AP DIAGNOSIS COMMENT: NORMAL
LAP AP PERIPHERAL SMEAR: NORMAL
LYMPHOCYTES # BLD AUTO: 1.8 THOU/UL (ref 0.8–4.4)
LYMPHOCYTES NFR BLD AUTO: 27 % (ref 20–40)
MCH RBC QN AUTO: 26.2 PG (ref 27–34)
MCHC RBC AUTO-ENTMCNC: 30.3 G/DL (ref 32–36)
MCV RBC AUTO: 86 FL (ref 80–100)
MONOCYTES # BLD AUTO: 0.5 THOU/UL (ref 0–0.9)
MONOCYTES NFR BLD AUTO: 7 % (ref 2–10)
MORPHOLOGY, PERIPHERAL BLOOD: ABNORMAL
NEUTROPHILS # BLD AUTO: 4 THOU/UL (ref 2–7.7)
NEUTROPHILS NFR BLD AUTO: 63 % (ref 50–70)
OVALOCYTES BLD QL SMEAR: ABNORMAL
PATH REPORT.COMMENTS IMP SPEC: NORMAL
PATH REPORT.COMMENTS IMP SPEC: NORMAL
PATH REPORT.FINAL DX SPEC: NORMAL
PLATELET # BLD AUTO: 569 THOU/UL (ref 140–440)
PLATELET BLD QL SMEAR: ABNORMAL
PMV BLD AUTO: 8.8 FL (ref 8.5–12.5)
RBC # BLD AUTO: 3.67 MILL/UL (ref 4.4–6.2)
WBC # BLD AUTO: 6.4 THOU/UL (ref 4–11)

## 2020-09-24 NOTE — RESULT ENCOUNTER NOTE
Your blood tests show that you are quite anemic.  I think you need to see a specialist.  I will put in a referral for that.

## 2020-09-24 NOTE — TELEPHONE ENCOUNTER
Called Adilene to coordinate patient's hematology appointment. She requested I make the appointment and she will assist in bringing him. Any day but Tuesdays and Thursdays OK. Will make appointment and let her know once referral has been sent. ./LR

## 2020-09-25 ENCOUNTER — COMMUNICATION - HEALTHEAST (OUTPATIENT)
Dept: ADMINISTRATIVE | Facility: HOSPITAL | Age: 55
End: 2020-09-25

## 2020-09-25 ENCOUNTER — RECORDS - HEALTHEAST (OUTPATIENT)
Dept: ADMINISTRATIVE | Facility: OTHER | Age: 55
End: 2020-09-25

## 2020-09-25 ENCOUNTER — AMBULATORY - HEALTHEAST (OUTPATIENT)
Dept: NEUROLOGY | Facility: CLINIC | Age: 55
End: 2020-09-25

## 2020-09-25 ENCOUNTER — MYC MEDICAL ADVICE (OUTPATIENT)
Dept: FAMILY MEDICINE | Facility: CLINIC | Age: 55
End: 2020-09-25

## 2020-09-25 DIAGNOSIS — F20.0 PARANOID SCHIZOPHRENIA, CHRONIC CONDITION (H): ICD-10-CM

## 2020-09-25 NOTE — TELEPHONE ENCOUNTER
Patient scheduled for appointment with july on:     Monday Oct 5 at 12:30pm  [email or web address]  [facility] [place] [address]   Look for cancer care entrance    They will be sending him a packet with some paperwork to fill out prior to the appointment.    Adilene, Guide care coordinator, aware. She will let Missael know and assist him with transportation. ./LR

## 2020-09-25 NOTE — PROGRESS NOTES
09/25/20   GASTROENTEROLOGY REFERRAL  [facility]  Phone [phone]  Fax: [phone]    Online referral placed with [facility] who will contact patient to schedule.     Karrie Vernon

## 2020-09-25 NOTE — PROGRESS NOTES
09/25/20   ONC/HEME ADULT REFERRAL  [facility]  [address] [place] [address]  Phone: [phone]  Fax: [phone]    Referral, demographics, office note(s) and labs faxed to the [facility] at [phone]. Once reviewed by the Nurse Navigator they will reach out to patient to schedule within 24 hours.     Karrie Vernon

## 2020-09-25 NOTE — PATIENT INSTRUCTIONS
09/25/20   NEUROPSYCHOLOGY REFERRAL  [facility]**  [address] [place] [address]  Phone: [phone]  Fax: [phone]  **They require physician referral. Referral faxed to [phone]. They will contact patient to schedule.     Karrie Vernon

## 2020-10-05 ENCOUNTER — OFFICE VISIT - HEALTHEAST (OUTPATIENT)
Dept: ONCOLOGY | Facility: CLINIC | Age: 55
End: 2020-10-05

## 2020-10-05 ENCOUNTER — TRANSFERRED RECORDS (OUTPATIENT)
Dept: HEALTH INFORMATION MANAGEMENT | Facility: CLINIC | Age: 55
End: 2020-10-05

## 2020-10-05 DIAGNOSIS — D50.0 IRON DEFICIENCY ANEMIA DUE TO CHRONIC BLOOD LOSS: ICD-10-CM

## 2020-10-05 DIAGNOSIS — R13.19 ESOPHAGEAL DYSPHAGIA: ICD-10-CM

## 2020-10-05 DIAGNOSIS — K20.80 LOS ANGELES GRADE D ESOPHAGITIS: ICD-10-CM

## 2020-10-05 DIAGNOSIS — R63.4 WEIGHT LOSS: ICD-10-CM

## 2020-10-06 ENCOUNTER — RECORDS - HEALTHEAST (OUTPATIENT)
Dept: ADMINISTRATIVE | Facility: OTHER | Age: 55
End: 2020-10-06

## 2020-10-06 ENCOUNTER — OFFICE VISIT (OUTPATIENT)
Dept: FAMILY MEDICINE | Facility: CLINIC | Age: 55
End: 2020-10-06
Payer: MEDICARE

## 2020-10-06 VITALS
TEMPERATURE: 98.7 F | WEIGHT: 124 LBS | HEIGHT: 67 IN | BODY MASS INDEX: 19.46 KG/M2 | OXYGEN SATURATION: 99 % | HEART RATE: 100 BPM | SYSTOLIC BLOOD PRESSURE: 106 MMHG | RESPIRATION RATE: 16 BRPM | DIASTOLIC BLOOD PRESSURE: 75 MMHG

## 2020-10-06 DIAGNOSIS — R61 NIGHT SWEATS: ICD-10-CM

## 2020-10-06 DIAGNOSIS — R63.4 WEIGHT LOSS: Primary | ICD-10-CM

## 2020-10-06 PROCEDURE — 99214 OFFICE O/P EST MOD 30 MIN: CPT | Performed by: FAMILY MEDICINE

## 2020-10-06 ASSESSMENT — MIFFLIN-ST. JEOR: SCORE: 1361.21

## 2020-10-06 NOTE — PATIENT INSTRUCTIONS
10/06/20   CT CHEST   [facility]   [phone]  Fax Orders to [phone]    [address] [address]    Appointment:   Arrival Time:  9:05am    NO PREP     Please bring a copy of your insurance card and photo ID    If you cannot make this appointment please call [phone] to reschedule    Order faxed to [facility] Scheduling at [phone].    Karrie Vernon

## 2020-10-07 NOTE — PROGRESS NOTES
"Subjective: Missael Garcia is a 54 year old who presents today for follow-up on his weight loss and anemia.  He is lost another 6 pounds since I last saw him.  Certainly he has iron deficiency but given the weight loss and the thrombocytopenia I wanted him to be evaluated by a hematologist.  The hematologist concurred that this is likely all iron deficiency.  Next question is of course why does he have iron deficiency.  He had an upper endoscopy just 2 months ago and he had a colonoscopy 4 years ago.  He will be following up with a GI physician in the next month.    He reports having poor appetite.  Other than that he has no idea why he has been losing so much weight.  He did have an abdominal and pelvic CT scan in August and no concerning findings there.  He is not had a chest CT anytime recently.    PMHX/PSHX/MEDS/ALLERGIES/SHX/FHX reviewed and updated in Epic.   ROS:   General: No fevers but he has night sweats every evening  Head: No headache   CV: No chest pain or palpitations.   Resp: No shortness of breath. No cough. No hemoptysis.   GI: No nausea or vomiting.   Objective: /75 (BP Location: Left arm, Patient Position: Sitting, Cuff Size: Adult Regular)   Pulse 100   Temp 98.7  F (37.1  C) (Oral)   Resp 16   Ht 1.702 m (5' 7.01\")   Wt 56.2 kg (124 lb)   SpO2 99%   BMI 19.42 kg/m     Gen: Well nourished and in NAD   HEENT: TMs normal color and landmarks, nasopharynx pink and moist, oropharynx pink and moist  CV: RRR - no murmurs, rubs, or gallups  Pulm: Clear to auscultation without wheezing or crackles  ABD: soft, nontender, BS intact  Extrem: no cyanosis, edema or clubbing   Psych: Euthymic    Assessment/ Plan:  We will continue with the iron therapy and he will be seeing the gastroenterologist next week.  Thereafter I think we should proceed with further imaging and labs to further evaluate his significant weight loss.    1.  Weight loss    - CT CHEST W CONTRAST; Future    2. Night sweats    - CT " CHEST W CONTRAST; Future      Total of 35 minutes was spent in face to face contact with patient with > 50% in counseling and coordination of care.  Options for treatment and/or follow-up care were reviewed with the patient. Missael Garcia was engaged and actively involved in the decision making process. He verbalized understanding of the options discussed and was satisfied with the final plan.      Tariq Guzman MD

## 2020-10-21 ENCOUNTER — HOSPITAL ENCOUNTER (OUTPATIENT)
Dept: CT IMAGING | Facility: CLINIC | Age: 55
Discharge: HOME | End: 2020-10-21
Attending: FAMILY MEDICINE

## 2020-10-21 DIAGNOSIS — R63.4 WEIGHT LOSS: ICD-10-CM

## 2020-10-21 DIAGNOSIS — R61 NIGHT SWEATS: ICD-10-CM

## 2020-10-22 DIAGNOSIS — R63.4 WEIGHT LOSS: ICD-10-CM

## 2020-10-22 DIAGNOSIS — R61 NIGHT SWEATS: ICD-10-CM

## 2020-10-22 NOTE — LETTER
October 28, 2020      Missael Garcia  [address]  [place] [address]          Dear BeronicaJose,    We are writing to inform you of your test results  These results are within the normal range for you.  Please follow up in the clinic in the next month.     If you have any questions or concerns, please call the clinic at the number listed above.       Sincerely,        Tariq Guzman MD

## 2021-04-24 ENCOUNTER — HEALTH MAINTENANCE LETTER (OUTPATIENT)
Age: 56
End: 2021-04-24

## 2021-05-04 ENCOUNTER — TELEPHONE (OUTPATIENT)
Dept: FAMILY MEDICINE | Facility: CLINIC | Age: 56
End: 2021-05-04

## 2021-05-04 NOTE — TELEPHONE ENCOUNTER
[facility] phone call message- general phone call:    Reason for call: the care coordinator called to ask for help to see if the pt received a covid shot he said he did already but does not know where or when the next one is due     Return call needed: Yes    OK to leave a message on voice mail? Yes    Primary language: English      needed? No    Call taken on May 4, 2021 at 1:20 PM by Sam Cevallos

## 2021-05-04 NOTE — TELEPHONE ENCOUNTER
Spoke with Adilene, care coordinator. Per JUANIS, patient received dose 1 of Moderna on 4/29/21, but I am unable to see where. Was not in [place] system. Adilene notes he said he got it downtown but doesn't know where and if his second dose is scheduled. Discussed the [facility] in [facility] sometimes give shots, and that MD also provides vaccine at [facility]. Provided her with two numbers to call the [facility] and [facility] for assistance. ./LR

## 2021-06-05 VITALS
TEMPERATURE: 98.7 F | BODY MASS INDEX: 19.47 KG/M2 | WEIGHT: 124.3 LBS | SYSTOLIC BLOOD PRESSURE: 114 MMHG | DIASTOLIC BLOOD PRESSURE: 70 MMHG

## 2021-06-10 NOTE — PROGRESS NOTES
[facility] Certification Request    May 2, 2017      Patient: Missael Garcia  MR Number: [id number]  YOB: 1965  Date of Visit: 5/2/2017      Dear Dr. Guzman:    Thank you for this referral.   We are seeing Missael Garcia for Physical Therapy of his bilateral knee pain.    Medicare and/or Medicaid requires physician review and approval of the treatment plan. Please review the plan of care and verify that you agree with the therapy plan of care by co-signing this note.      Plan of Care  Authorization / Certification Start Date: 05/02/17  Authorization / Certification End Date: 07/31/17  Authorization / Certification Number of Visits: 12  Communication with: Referral Source  Patient Related Instruction: Nature of Condition;Treatment plan and rationale;Self Care instruction;Posture;Expected outcome;Body mechanics;Next steps;Basis of treatment;Precautions  Times per Week: 1-2  Number of Weeks: 12  Number of Visits: 12  Therapeutic Exercise: ROM;Strengthening;Stretching  Neuromuscular Reeducation: kinesio tape;posture;balance/proprioception;TNE;core  Manual Therapy: soft tissue mobilization;myofascial release;joint mobilization;muscle energy  Modalities: electrical stimulation;TENS;ultrasound;cold pack;hot pack (as needed for pain)  Gait Training: to reduce pain and improve gait pattern/distance  Equipment: theraband    Goals:  Pt. will be independent with home exercise program in : 4 weeks (to self-manage pain and improve function)  Pt. will be able to walk : 30 minutes;for community mobility;with less pain;in 12 weeks (less than 3/10 pain)  Patient will increase : LEFS score;for improved quality of function;for improved quality of life;in 12 weeks;by _ points  by ___ points: >9 pts      If you have any questions or concerns, please don't hesitate to call.    Sincerely,      Carissa Dorsey, PT        Physician recommendation:     ___ Follow therapist's recommendation        ___ Modify  therapy      *Physician co-signature indicates they certify the need for these services furnished within this plan and while under their care.        [facility]   Knee Initial Evaluation    Patient Name: Missael Garcia  Date of evaluation: 5/2/2017  Referral Diagnosis:   Primary osteoarthritis of both knees [M17.0]  - Primary         Referring provider: Tariq Guzman MD  Visit Diagnosis:     ICD-10-CM    1. Bilateral chronic knee pain M25.561     M25.562     G89.29    2. Primary osteoarthritis of both knees M17.0    3. Generalized muscle weakness M62.81        Assessment:      Impairments in  pain, posture, ROM, joint mobility, strength, ADL's, gait/locomotion and balance  Patient's signs and symptoms are consistent with OA of pily knees. Pt presents with chronic, bilateral knee pain (R>L). He has decreased proximal strength throughout his hips and slight limitations in R knee flexion. Overall, he has pain with standing and squatting activities including walking, bending and lifting..  The POC is dynamic and will be modified on an ongoing basis.  Barriers to achieving goals as noted in the assessment section may affect outcome.  Prognosis to achieve goals is  good   Skilled PT is required to reduce pain and improve function.  Pt. is appropriate for skilled PT intervention as outlined in the Plan of Care (POC).  Pt. is a good candidate for skilled PT services to improve pain levels and function.      Goals:  Pt. will be independent with home exercise program in : 4 weeks (to self-manage pain and improve function)  Pt. will be able to walk : 30 minutes;for community mobility;with less pain;in 12 weeks (less than 3/10 pain)  Patient will increase : LEFS score;for improved quality of function;for improved quality of life;in 12 weeks;by _ points  by ___ points: >9 pts    Patient's expectations/goals are realistic.    Barriers to Learning or Achieving Goals:  Chronicity of the problem.  Mental illness or  "emotional factors.  schizoaffective disorder   Difficulty following 2 step commands throughout session       Plan / Patient Instructions:      Plan of Care:   Authorization / Certification Start Date: 05/02/17  Authorization / Certification End Date: 07/31/17  Authorization / Certification Number of Visits: 12  Communication with: Referral Source  Patient Related Instruction: Nature of Condition;Treatment plan and rationale;Self Care instruction;Posture;Expected outcome;Body mechanics;Next steps;Basis of treatment;Precautions  Times per Week: 1-2  Number of Weeks: 12  Number of Visits: 12  Therapeutic Exercise: ROM;Strengthening;Stretching  Neuromuscular Reeducation: kinesio tape;posture;balance/proprioception;TNE;core  Manual Therapy: soft tissue mobilization;myofascial release;joint mobilization;muscle energy  Modalities: electrical stimulation;TENS;ultrasound;cold pack;hot pack (as needed for pain)  Gait Training: to reduce pain and improve gait pattern/distance  Equipment: theraband    Plan for next visit: progress hip strengthening, assess response to KTape     Subjective:        Social information:   Occupation:unemployed   Work Status:NA      History of Present Illness:    Missael is a 51 y.o. male who presents to therapy today with complaints of pily knee pain. Date of onset/duration of symptoms is \"very long time, all of life\".  Pt reports a history of a R patellar subluxation with pain in his right knee since. Pt has a history of 2 bunion surgeries a few years ago on each foot. Has not had any previous surgeries or injections on his knees. Onset was gradual. Symptoms are constant and not improving. Pt has tried exercises - LAQ after sitting for long periods of time. He reports \"crunching\" in his knees but it does not hurt. Pt would like to be able to walk for about 20-30 min. He is also experiencing pain on the anterior right foot due to his previous bunion surgery. He reports falling while walking in the " living room due to knee pain but did not report this fall initially on form. Somewhat poor historian and difficulty with obtaining a complete history.    Pain Ratin  Pain rating at best: 1  Pain rating at worst: 10  Pain description:shooting    Functional limitations are described as occurring with:   Stand 10 min  Walk 10 min  Bend  Transitions  Walk on uneven surfaces  Stairs  Kneel/squat  Yard and household work      Patient reports benefit from:  tylenol and staying off feet       Objective:      Note: Items left blank indicates the item was not performed or not indicated at the time of the evaluation.    Patient Outcome Measures :    Lower Extremity Functional Scale (_/80): 38   Scores range from 0-80, where a score of 80 represents maximum function. The minimal clinically important difference is a positive change of 9 points.    Knee Examination  1. Bilateral chronic knee pain     2. Primary osteoarthritis of both knees     3. Generalized muscle weakness       Precautions/Restrictions:  None  Involved Side: Bilateral (R>L)  Posture Observation:      General standing posture is fair- tends to weight shift to the L to avoid pain, increased knee flexion on R in standing and increased R foot ER.  Assistive Device: None  Hip Clearing: Does not provoke symptoms - neg scour, GABRIEL, limited hip IR pily and pain in low back with R hip flexion and IR/ER    Increased mobility on R patella compare to L    30 sec sit to stand: 10 with pain on R medial knee; arms crossed over chest    Supine: rests in pily ER likely coming from hip    Knee ROM:   Date: 2017      Knee ROM ( ) AROM in degrees AROM in degrees AROM in degrees    Right Left Right Left Right Left   Knee Flexion (0-130 ) 120 130       Knee extension (0 ) 3 deg past neutral 2 deg past neutral        PROM in degrees PROM in degrees PROM in degrees    Right Left Right Left Right Left   Knee Flexion (0-130 )         Knee extension (0 )            Hip/Knee  Strength     Date: 5/2/2017     Hip/Knee Strength (/5) MMT MMT MMT    Right Left Right Left Right Left   Hip Flexion 5 5       Hip Abduction         Hip Adduction         Hip Extension 2+ 2+       Hip External Rotation 3+ 3+       Hip Internal Rotation         Knee Extension 5 5       Knee Flexion 4 4       Ankle DF: 5/5 pily      Palpation: tender on R at medial joint line    Knee Special Tests:     Meniscal Tests Right (+/-) Left (+/-) Ligament Tests Right (+/-) Left (+/-)   Roddy s - - Lachman - -   Joint Line Tenderness + - Anterior Drawer - -   Thessaly   Posterior Drawer     Apley s + - Posterior sag     Knee OA Right (+/-) Left (+/-) Valgus Stress - -   1. > 49 y/o  2. Stiffness > 30 minutes  3. Crepitus   4. Bony tenderness  5. Bone enlargement  6. No warmth to the touch +  +  +  +  +  + +  +  +  +  +  +   Varus Stress - -   Other Right (+/-) Left (+/-) Other- Kingsley - +   Ely s   Other     Cally              Treatment Today     TREATMENT MINUTES COMMENTS   Evaluation 20 -knee pain pily   Self-care/ Home management     Manual therapy     Neuromuscular Re-education 10 -KTape to pily knees for pain relief. 2 I strips on each leg starting at distal patellar tendon and wrapping medial and lateral around the patella; reviewed indications/precautions and skin was prepped.   Therapeutic Activity     Therapeutic Exercises 20 -see exercise flow sheet  -educated on POC, diagnosis/pathology and HEP   Gait training     Modality__________________                Total 50    Blank areas are intentional and mean the treatment did not include these items.        PT Evaluation Code: (Please list factors)  Patient History/Comorbidities: schizoaffect disorder  Examination: knee pain  Clinical Presentation: stable   Clinical Decision Making: low    Patient History/  Comorbidities Examination  (body structures and functions, activity limitations, and/or participation restrictions) Clinical Presentation Clinical Decision Making  (Complexity)   No documented Comorbidities or personal factors 1-2 Elements Stable and/or uncomplicated Low   1-2 documented comorbidities or personal factor 3 Elements Evolving clinical presentation with changing characteristics Moderate   3-4 documented comorbidities or personal factors 4 or more Unstable and unpredictable High                Carissa Dorsey, PT, DPT  5/2/2017  2:40 PM      [facility] Discharge Summary      Patient was seen for 1 visits on initial evaluation with 1 missed appointments.  The patient discontinued therapy, did not return.   Unable to formally assess progress towards goals as pt did not return to PT.    Therapy will be discontinued at this time.  The patient will need a new referral to resume.    Thank you for your referral.  Carissa Dorsey, PT, DPT  6/5/2017   9:45 AM

## 2021-06-12 NOTE — PATIENT INSTRUCTIONS - HE
1.  You need follow-up with gastroenterology.  Follow-up either with [facility] or with [facility].  This is crucial.    2.  Take a multivitamin tablet daily.    3.  Start taking an iron tablet daily with food.  If you tolerate that after a week, try to increase it to 2 times daily with food.    4.  Please see below for a list of iron-containing foods.  Try to incorporate them into your diet.        http://www.[employer]/  2014 [employer] EngineToDate     2014 UpToDate    Dietary sources of iron   Food  Approximate measure  Iron (mg)    High iron sources    Cream of Wheat (quick or instant)*  1/2 cup 7.8   Kidney, beef  2 oz (60 g) 5.3   Liver, beef  2 oz (60 g) 5.8   Liver, calf  2 oz (60 g) 9.0   Liver, chicken  2 oz (60 g) 6.0   Liverwurst  2 oz (60 g) 3.6   Prune juice 1/2 cup 5.1   Spinach  1/2 cup 3.2   Moderate iron sources    All-Bran cereal  1/2 cup 2.9   Almonds, dried unblanched  1/2 cup 3.0   Dried beans and peas    Baked beans, no pork  1/4 cup  1.5    Blackeye peas, cooked  1/4 cup  0.8    Chick peas, dry  1/4 cup  3.5    Great northern beans, cooked  1/4 cup  1.3    Green peas, cooked  1/4 cup  1.4    Lentils, dry  1/4 cup  3.4    Lima beans, cooked  1/4 cup  1.3    Navy beans, cooked  1/4 cup  1.3    Red beans, dry  1/4 cup  3.5    Soybeans, cooked  1/4 cup  1.4    White beans, dry  1/4 cup  3.9    Beef, cooked  2 oz (60 g) 2-3?   Ham, cooked  2 oz (60 g) 1.3   Valenzuela, cooked  2 oz (60 g) 1.9   Peaches, dried  1/4 cup 2.4   Peanuts, roasted without skins  3 1/2 oz (100 g) 3.2   Pork, cooked  2 oz (60 g) 2-3?   Prunes, dried  2 large 1.1   Scallops  2 oz (60 g) 1.6   Turkey, cooked  2 oz (60 g) 1.7   Approximate iron content of children's favorite foods    Hamburger, small  1  3.0    Large  1  5.2    Big Mac  1  4.3    Quarter Pounder  1  5.1    Spaghetti with meatballs  1 cup 3.3   Frankfurter and beans  1 cup 4.8   Pork and beans  1 cup 5.9   Raisins  5/8 cup 3.5   Cereals, fortified  1 serving 4.5-17.8    Nuts  1 cup 5.0-7.0   Seeds, sunflower  3 1/2 oz (100 g) 7.1   Ch[place] con carne 1 cup 3.6   Beef burrito or richard  1 medium 3.4-4.6   Cheese pizza  2 slices 3.0   Cheese pizza with beef  2 slices 4.8   White bread  1 piece 0.7   * Or other fortified cereals which contain 10 mg of iron per ounce or 100 percent RDA per serving.    As organ meats are generally high in cholesterol, these iron-rich foods should be eaten in moderation.  ? Depending on cut, the greatest amounts of iron are generally found in the constantine, flank, and bottom round cuts of beef.  ? Depending on cut, the greatest amounts of iron are generally found in the loin, sirloin, tenderloin, and picnic shoulder cuts of pork.    Raisins, nuts, and seeds are not generally recommended for children under age three because of risk of choking.   Data from: Dev ODONNELL, Janessa RAMÍREZ (Eds), Nutrition in Pediatrics, 2nd ed, [employer] [facility], [place] 1997.   Graphic 60857 Version 1.0
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
## 2021-06-12 NOTE — PROGRESS NOTES
Patient is here for consultation of benign hematology.  Accompanied by Adilene rosenthal.  Mel Arroyo RN

## 2021-06-20 NOTE — LETTER
Letter by Alexia Lane MD at      Author: Alexia Lane MD Service: -- Author Type: --    Filed:  Encounter Date: 9/25/2020 Status: (Other)       Dear Missael Garcia    Thank you for choosing [facility] for your care.  We are committed to providing you with the highest quality and compassionate healthcare services.  The following information pertains to your first appointment with our clinic.    Date/Time of appointment:  Monday October 5, 12:45 pm    Note: Please arrive 30 minutes prior to your appointment time.  This allows time to complete forms, possible labs and nursing assessment.    Name of your Physician: Dr Alexia Lane    What to bring to your appointment:    Completed Patient History/Initial Nursing Assessment and Medication/Allergy List (these forms were sent to you).    Any paperwork or films from your physician that we have asked you to bring.    Your current insurance card(s).    Parking:    Please refer to the map included to direct you to [facility].    The Radiation Oncology parking lot is west of the main entrance, this is free parking and is right next to the Cancer Care Entrance.    Come in the Cancer Care Entrance and check in.        We hope these instructions are helpful to you.  If you have any questions or concerns, please call us at [phone].  It is our pleasure to assist you.    Warm Regards,  Julia Hannah  Nurse Navigator  [phone]

## 2021-09-17 DIAGNOSIS — I95.1 ORTHOSTATIC HYPOTENSION: Primary | ICD-10-CM

## 2021-09-21 DIAGNOSIS — D50.9 IRON DEFICIENCY ANEMIA, UNSPECIFIED IRON DEFICIENCY ANEMIA TYPE: Primary | ICD-10-CM

## 2021-09-21 RX ORDER — ASPIRIN 81 MG
TABLET, DELAYED RELEASE (ENTERIC COATED) ORAL
Qty: 90 TABLET | Refills: 11 | Status: SHIPPED | OUTPATIENT
Start: 2021-09-21 | End: 2022-09-22

## 2021-09-21 RX ORDER — FERROUS SULFATE 325(65) MG
325 TABLET ORAL 2 TIMES DAILY
Qty: 60 TABLET | Refills: 1 | Status: SHIPPED | OUTPATIENT
Start: 2021-09-21

## 2021-09-21 RX ORDER — FERROUS SULFATE 325(65) MG
TABLET ORAL
Qty: 56 TABLET | Refills: 3 | Status: SHIPPED | OUTPATIENT
Start: 2021-09-21

## 2021-10-05 ENCOUNTER — OFFICE VISIT (OUTPATIENT)
Dept: NEUROLOGY | Facility: CLINIC | Age: 56
End: 2021-10-05
Attending: FAMILY MEDICINE
Payer: MEDICARE

## 2021-10-05 DIAGNOSIS — F20.0 CHRONIC PARANOID SCHIZOPHRENIA (H): ICD-10-CM

## 2021-10-05 DIAGNOSIS — F02.80 MAJOR NEUROCOGNITIVE DISORDER DUE TO MULTIPLE ETIOLOGIES WITHOUT BEHAVIORAL DISTURBANCE (H): Primary | ICD-10-CM

## 2021-10-05 NOTE — LETTER
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 intact. His mood was dysthymic and anxious and his affect was appropriately reactive. Rapport was easily established and eye contact was appropriate.     During the testing session, Mr. Garcia was alert throughout. He was pleasant and cooperative throughout the evaluation, yet continued to be highly fidgety and anxious. He also exhibited worsened GI problems during testing. He seemed to understand test instructions without difficulty. No frontal signs were observed behaviorally. Mr. Garcia appeared adequately motivated and engaged easily during testing. His score on a stand-alone measure of performance validity was in the valid range. Overall, the following results are considered a reasonably valid estimation of his current cognitive abilities.    OPTIMAL PREMORBID INTELLECT:  Optimal premorbid intellectual abilities were estimated as falling in the below average range based on Mr. Garcia's educational and occupational histories and performance on tasks least likely to be affected by acquired brain dysfunction.    SUMMARY OF TEST RESULTS:  ORIENTATION. Performance on a mental status exam, assessing  orientation to personal and current information, resulted in a below average score. He was oriented to person, place, time, and date and was able to correctly name the current and previous presidents.    ATTENTION/WORKING MEMORY. The patient's score on a measure sensitive to sustained auditory-verbal attention and concentration (WAIS-IV Digit Span) was classified as exceptionally low, as he was able to recite up to 5 digits forward (a low average score), up to 2 digits backward (an exceptionally low score), and up to 2 digits in sequence (an exceptionally low score).      PROCESSING SPEED. Speeded digit-symbol coding was measured as a below average score (WAIS-IV Coding). On a test of complex concentration that requires speeded numeric sequencing (Trails A), the patient's score was exceptionally low for completion time and without error. On the DKEFS Color-Word Interference Test, speeded color naming was exceptionally low, as was speeded word reading.     LANGUAGE PROCESSING. Language comprehension appeared intact. His score on a fund of information task was measured as below average (WAIS-IV Information). Confrontation naming was measured as a within normal limits score (45/60; BNT-2). The patient's performance on a test of phonemic fluency resulted in a low average score (FAS), while his semantic fluency was exceptionally low (Animals).     VISUOSPATIAL/CONSTRUCTIONAL SKILLS. His score on a test of visuoconstruction with three-dimensional blocks was exceptionally low (WAIS-IV Block Design). Copy of a complex geometric figure was notably impaired and piecemeal in approach (RCFT Copy). Copy of simpler geometric figures was below average (WMS-IV Visual Reproduction Copy).       LEARNING/MEMORY. On the WMS-IV Logical Memory subtest, immediate memory for two paragraph-length stories resulted in an exceptionally low score. After a 20-minute delay, the patient was unable to recall any of the details from either story,  even after being provided with prompts (0% retention rate; exceptionally low score). On the recognition trial, the patient's score was again classified as exceptionally low. He exhibited a strong positive response bias on the recognition trial.    On a 12-item verbal list-learning task (HVLT-R), the patient acquired up to 4 words (33%) of the word list by the 3rd and final learning trial (raw scores over trials = 1, 2, 4). Total learning acquisition was classified as an exceptionally low score. After a 20-minute delay, the patient was unable to recall any of the items, reflecting an exceptionally low score with a 0% retention rate. His recognition discriminability score was exceptionally low, as he correctly recognized 11/12 items but also made 5 false-positive errors (again demonstrating a strong positive response bias).     On a visual memory measure (WMS-IV Visual Reproduction), immediate recall of simple geometric figures was exceptionally low, while delayed recall of the figures was also exceptionally low (with a 0% retention rate). Delayed recognition of the figures was again exceptionally low.     EXECUTIVE FUNCTIONS. On a test of inhibition and cognitive flexibility, (DKEFS Color-Word Interference Inhibition trial), the patient's score was exceptionally low for completion time and made 13 errors, which is exceptionally low. On a test that requires speeded alpha-numeric sequencing/cognitive set-shifting (Trails B), the patient had notable difficulty completing the practice items and the task was subsequently aborted. Phonemic fluency was low average (FAS).     MOOD. On the PHQ-9 a self report measure of depressive symptomatology, he obtained a score of 20, placing him in the range of severe depression. He denied suicidal ideation. On the MONTSE-7, a self-report measure of anxiety, he obtained a score of 21,  placing him in the range of severe  anxiety.    ____________________________________________________________________________________    SERVICES PROVIDED & TIME:  On-site Office Visit Details   A clinical interview/neurobehavioral status examination was conducted with the patient and documented. I thoroughly reviewed the medical record, selected the neuropsychological test battery, provided supervision to the trained examiner/technician, scored all of the neuropsychological tests (2+ tests), interpreted/integrated patient data and test results, and engaged in clinical decision making, treatment planning, report writing/preparation and provision of interactive feedback of test results on 10/18/2021. A trained examiner/technician administered the neuropsychological tests (2+ tests).  Please see below for a breakdown of time spent and the associated codes billed for these services.  Services   Time Spent  CPT Codes   Neurobehavioral Status Exam:  (e.g., clinical interview and neurobehavioral status exam, interpretation, report)   80 minutes   1 x 96116   Neuropsychological Evaluation Services:   (e.g., integration, interpretation, treatment planning, clinical decision making, feedback via telehealth)   204 minutes   1 x 96132  2 x 96133   Neuropsychological Testing by Psychologist:  (e.g., test administration, scoring, 2+ tests administered)   51 minutes   1 x 96136  1 x 96137     Neuropsychological Testing by Trained Examiner/Technician:  (e.g., test administration, scoring, 2+ tests administered)   106 minutes   1 x 96138  3 x 96139   For diagnostic and coding purposes, Mr. Garcia has a history of chronic paranoid schizophrenia, recurrent major depressive disorder, hypertension, severe esophagitis, anemia, and history of polysubstance abuse. He was referred for an evaluation of major neurocognitive disorder. Please note, all charges are filed at the completion of the Episode of Care and associated with the final encounter date (feedback session on  10/18/2021).           Again, thank you for allowing me to participate in the care of your patient.        Sincerely,        Mabel Frey.ANGELICA, LP

## 2021-10-08 NOTE — PROGRESS NOTES
The patient was seen for a neuropsychological evaluation for the purposes of diagnostic clarification and treatment planning. 106 minutes of face-to-face testing were provided by this writer. The patient was cooperative with testing. No concerns were brought to my attention. Please see Dr. Harrison's report for a detailed description of the charges and interpretation and integration of the findings.

## 2021-10-09 ENCOUNTER — HEALTH MAINTENANCE LETTER (OUTPATIENT)
Age: 56
End: 2021-10-09

## 2021-10-18 ENCOUNTER — VIRTUAL VISIT (OUTPATIENT)
Dept: NEUROLOGY | Facility: CLINIC | Age: 56
End: 2021-10-18
Payer: MEDICARE

## 2021-10-18 DIAGNOSIS — F20.0 CHRONIC PARANOID SCHIZOPHRENIA (H): ICD-10-CM

## 2021-10-18 DIAGNOSIS — F02.80 MAJOR NEUROCOGNITIVE DISORDER DUE TO MULTIPLE ETIOLOGIES WITHOUT BEHAVIORAL DISTURBANCE (H): Primary | ICD-10-CM

## 2021-10-18 PROCEDURE — 96137 PSYCL/NRPSYC TST PHY/QHP EA: CPT | Performed by: CLINICAL NEUROPSYCHOLOGIST

## 2021-10-18 PROCEDURE — 96133 NRPSYC TST EVAL PHYS/QHP EA: CPT | Mod: 95 | Performed by: CLINICAL NEUROPSYCHOLOGIST

## 2021-10-18 PROCEDURE — 96138 PSYCL/NRPSYC TECH 1ST: CPT | Mod: 59 | Performed by: CLINICAL NEUROPSYCHOLOGIST

## 2021-10-18 PROCEDURE — 96116 NUBHVL XM PHYS/QHP 1ST HR: CPT | Performed by: CLINICAL NEUROPSYCHOLOGIST

## 2021-10-18 PROCEDURE — 96136 PSYCL/NRPSYC TST PHY/QHP 1ST: CPT | Performed by: CLINICAL NEUROPSYCHOLOGIST

## 2021-10-18 PROCEDURE — 96139 PSYCL/NRPSYC TST TECH EA: CPT | Mod: 59 | Performed by: CLINICAL NEUROPSYCHOLOGIST

## 2021-10-18 PROCEDURE — 96132 NRPSYC TST EVAL PHYS/QHP 1ST: CPT | Mod: 95 | Performed by: CLINICAL NEUROPSYCHOLOGIST

## 2021-10-18 NOTE — PATIENT INSTRUCTIONS
SUMMARY OF FINDINGS:   Due to the ongoing COVID-19 pandemic, this assessment was conducted using PPE worn by the examiner and a face-mask for the patient. The standard administration of these tests involves direct face-to-face methods. The full impact of applying non-standard administration methods with PPE is not fully appreciated at this time. As such, the diagnostic conclusions and recommendations for treatment provided in this report are being advanced with caution.     With these limitations in mind, results of testing indicate that Mr. Garcia is of estimated below average premorbid intellectual functioning; however, several of Mr. Garcia's performances fall well below that estimate. Specifically, he exhibits severe impairment on tests of processing speed, verbal and nonverbal learning/memory, visuoconstructional abilities, and executive functions (including cognitive inhibition, mental flexibility/set-shifting, planning, and complex concentration/working memory). His profoundly slow processing speed and inattention likely exacerbated some of his impaired scores in other domains to some degree.      With regard to his learning/memory more specifically, Mr. Garcia' scores suggest a significant amnestic profile. He has notable difficulty absorbing new information, and he does not retain any information he initially learned after 20-30 minutes has passed (retention rates are 0% across all memory measures). His recall does not benefit from prompts/cues on recognition testing. Again, this pattern of scores on memory testing is most suggestive of a significant encoding deficit for both verbal and nonverbal material, which is usually seen in the context of bilateral hippocampal dysfunction.     Other areas of cognition are relatively spared, including Mr. Garcia' basic attention and language processing abilities (including comprehension, single word reading, verbal fluencies, and confrontation  naming).     Emotionally, the patient endorses severe symptoms of depression and anxiety on self-report questionnaires. This is fairly consistent with his reports during the clinical interview, during which he states that his anxiety is worsening lately. He also continues to experience some auditory hallucinations and delusions related to his previously established diagnosis of schizophrenia.     IMPRESSIONS:    Overall, these results are suggestive of nearly global cerebral dysfunction in the frontal, subcortical, bilateral mesial temporal, and nondominant (presumably right) parietal regions. There appears to be a relative sparing of the lateral temporal structures.    Given the severity of Mr. Garcia' cognitive impairment and that his activities of daily living have been affected by his cognitive difficulties, he appears to meet criteria for Major Neurocognitive Disorder.    While etiology is somewhat unclear, I suspect it is multifactorial.     These factors likely include cognitive sequelae associated with chronic schizophrenia, which is known to disrupt frontal and temporal-limbic systems that can be progressive as individuals with schizophrenia age.     In addition, sedative medication side effects may be a factor contributing to the patient's consistently slowed processing speed.     Pain and physical discomfort (particularly related to his severe GI issues that were apparent during testing) may be exacerbating his cognitive deficits to a degree but are certainly not believed to be the primary or sole etiology.     Lastly, while possibly less likely, an independent neurodegenerative syndrome cannot be fully ruled out. Ongoing monitoring is recommended.     DIAGNOSIS:  Major Neurocognitive Disorder, likely due to Multiple Etiologies     Chronic paranoid schizophrenia (per history)     PTSD (per history)     RECOMMENDATIONS:  1) Ongoing participation in mental health treatment remains warranted. His  psychiatrist may consider further adjustments to his medication regimen to alleviate sedating medication side effects, if clinically appropriate.      2) Consider a referral to neurology for ongoing monitoring, evaluation, and treatment.      3) A brain MRI is recommended along with other relevant blood work (e.g., updated vitamin B12, MMA, TSH, folate, etc.)  in order to rule out other causes of cognitive decline. This recommendation will be deferred to the patient's PCP, psychiatrist, and/or neurologist.     4) Ongoing support and oversight with IADLs remains warranted. Given that the patient lives alone and has been having difficulty remembering to take his medications, increased assistance in daily life may be warranted at this time (e.g., increased home health care services/PCA services). He currently manages his own finances, reportedly without issue. Consider assigning a Power of , if not already completed. The patient may even benefit from increased oversight with financial management (e.g., a representative payee) given the nature and severity of his cognitive impairment. Fortunately, the patient is no longer driving.     5) The patient is encouraged to utilize cognitive compensatory strategies in daily life, including utilizing note pads, checklists, to-do lists, a calendar/planner, labeled alarm reminders, a pillbox, and maintaining a daily morning and nighttime routine and an organized living/work environment.     6) Mr. Garcia is encouraged to remain physically, socially, and mentally active in order to optimize his brain health.     7) Neuropsychological follow-up is recommended in about 18-24 months (or as clinically indicated) in order to monitor his cognitive status, help to clarify etiology, and update recommendations. The current test data can be used as a baseline to which future comparisons can be made.       Thank you for allowing me to participate in your care. Please contact me with  any questions regarding the content of this report.          Lyly Harrison PsyD, LP  Licensed Clinical Neuropsychologist  [address], [address]  Phone: [phone]      Cognitive Strategies    Take notes! Keep a small notepad with you in your purse/pocket/bag and write things down that you want to remember. You might also keep a notepad in a convenient location in your home (e.g., next to your telephone or calendar). Taking your notes in a note pad (instead of on multiple post-it notes) might help you stay organized, and you will also remember where to go to look for the notes that you took.    Create checklists, grocery lists, and to-do lists. Cross things off as you finish them.    Use a calendar or planner and get in the habit of checking it daily. Make it a part of your morning and/or nighttime routine to remind yourself of upcoming events, activities, or appointments. Cross the days off as they pass so that you can quickly glance at the calendar to know what day it is and what you have going on.    Set alarm reminders. For example, you can set an alarm to remind you to take your medications, turn off the oven, turn off the sprinkler outside, or to start getting ready for your appointment. If you use a smart phone, often times you can label the alarm that goes off so that you know what the alarm is for when it chimes.    Use a pillbox to follow your medication regimen. A pillbox acts as a nice visual cue to remind us to take our medications. If you have trouble remembering whether or not you have already taken your medications today, a pillbox can be extremely helpful to help with this issue.    Use a GPS when driving to help you stay on route.    When having an important conversation or completing an important task, reduce as many distractions in the environment as you can. For example, turn off the TV or the music in the background. Turn off or  silence your cell phone. Clear your work area of everything that you do not need for the task at hand.    Establish set locations for certain items. For example, if you keep your keys on a hook by your door, you will always know where to go to look for them.     Maintain a daily routine, especially for morning and nighttime tasks.

## 2021-10-18 NOTE — PROGRESS NOTES
"NEUROPSYCHOLOGY TELE-HEALTH FEEDBACK VISIT  [facility]    Telephone Visit  Missael Garcia is a 56 year old male who is being evaluated via a billable telephone visit.     The patient has been notified of the following:      \"This telephone visit will be conducted via a call between you and your provider. We have found that certain health care needs can be provided without the need for a physical exam.  This service lets us provide the care you need with a phone conversation. If during the course of the call the provider feels a telephone visit is not appropriate, you will not be charged for this service.\"     Patient has given verbal consent to a Telephone visit? Yes  Consent has been obtained for this service by 1 care team member: yes.      Visit Summary  The purpose of today s appointment was to provide feedback regarding Mr. Garcia recent neuropsychological consult completed on 10/5/2021. His nurse , Adilene, joined us for today's visit. We began the session by discussing his experience during the evaluation, which he had no recollection of. I provided Mr. Garcia with detailed feedback regarding his performance on cognitive testing and his pattern of cognitive strengths and weaknesses.  I discussed my overall impressions and recommendations and provided the opportunity for Mr. Garcia to ask any questions that he had about the evaluation. At the end of the session, he indicated that he understood the results and that I had answered all of his questions.       Lyly Harrison PsyD,   Licensed Clinical Neuropsychologist  [facility] [address], [address]  [place] [address]  Phone: [phone]      Telephone Visit Details    Type of service:  Telephone Visit  Start Time: 2:34 PM  End Time: 3:07 PM    Originating Location (pt. Location): Home    Distant Location (provider location):  Remote work for [facility] " [facility]    Mode of Communication:  Janis Garcia was evaluated via a billable telephone visit. For diagnostic and coding purposes, Mr. Garcia was referred for an evaluation of Major Neurocognitive Disorder. As this is the final date for this Episode of Care (initiated on 10/5/2021) all charges for the entire Episode of Care will be filed today. Please see the 10/5/2021 evaluation for a detailed description of codes and services, including services provided today.      In brief:   1 x 96116  0 x 96121  1 x 96132  2 x 96133  1 x 96136  1 x 96137  1 x 96138  3 x 96139

## 2021-10-18 NOTE — LETTER
"    10/18/2021         RE: Missael Garcia  [address]  [place] [address]        Dear Colleague,    Thank you for referring your patient, Missael Garcia, to the [facility]. Please see a copy of my visit note below.    NEUROPSYCHOLOGY TELE-HEALTH FEEDBACK VISIT  [facility]    Telephone Visit  Missael Garcia is a 56 year old male who is being evaluated via a billable telephone visit.     The patient has been notified of the following:      \"This telephone visit will be conducted via a call between you and your provider. We have found that certain health care needs can be provided without the need for a physical exam.  This service lets us provide the care you need with a phone conversation. If during the course of the call the provider feels a telephone visit is not appropriate, you will not be charged for this service.\"     Patient has given verbal consent to a Telephone visit? Yes  Consent has been obtained for this service by 1 care team member: yes.      Visit Summary  The purpose of today s appointment was to provide feedback regarding Mr. Garcia recent neuropsychological consult completed on 10/5/2021. His nurse , Adilene, joined us for today's visit. We began the session by discussing his experience during the evaluation, which he had no recollection of. I provided Mr. Garcia with detailed feedback regarding his performance on cognitive testing and his pattern of cognitive strengths and weaknesses.  I discussed my overall impressions and recommendations and provided the opportunity for Mr. Garcia to ask any questions that he had about the evaluation. At the end of the session, he indicated that he understood the results and that I had answered all of his questions.       Lyly Harrison PsyD,   Licensed Clinical Neuropsychologist  [facility]  [address], [address]  [place], [place] " [address]  Phone: [phone]      Telephone Visit Details    Type of service:  Telephone Visit  Start Time: 2:34 PM  End Time: 3:07 PM    Originating Location (pt. Location): Home    Distant Location (provider location):  Remote work for [facility]    Mode of Communication:  Telephone    Missael Garcia was evaluated via a billable telephone visit. For diagnostic and coding purposes, Mr. Garcia was referred for an evaluation of Major Neurocognitive Disorder. As this is the final date for this Episode of Care (initiated on 10/5/2021) all charges for the entire Episode of Care will be filed today. Please see the 10/5/2021 evaluation for a detailed description of codes and services, including services provided today.      In brief:   1 x 96116  0 x 96121  1 x 96132  2 x 96133  1 x 96136  1 x 96137  1 x 96138  3 x 96139        Again, thank you for allowing me to participate in the care of your patient.        Sincerely,        Lyly Harrison Psy.D, LP

## 2021-10-27 PROBLEM — F03.90 MAJOR NEUROCOGNITIVE DISORDER (H): Status: ACTIVE | Noted: 2021-10-27

## 2022-05-16 ENCOUNTER — HEALTH MAINTENANCE LETTER (OUTPATIENT)
Age: 57
End: 2022-05-16

## 2022-09-11 ENCOUNTER — HEALTH MAINTENANCE LETTER (OUTPATIENT)
Age: 57
End: 2022-09-11

## 2023-06-03 ENCOUNTER — HEALTH MAINTENANCE LETTER (OUTPATIENT)
Age: 58
End: 2023-06-03

## 2023-09-14 DIAGNOSIS — I95.1 ORTHOSTATIC HYPOTENSION: ICD-10-CM

## 2023-09-14 RX ORDER — ASPIRIN 81 MG
TABLET, DELAYED RELEASE (ENTERIC COATED) ORAL
Qty: 90 TABLET | Refills: 3 | Status: SHIPPED | OUTPATIENT
Start: 2023-09-14

## 2024-07-13 ENCOUNTER — HEALTH MAINTENANCE LETTER (OUTPATIENT)
Age: 59
End: 2024-07-13